# Patient Record
Sex: MALE | Race: WHITE
[De-identification: names, ages, dates, MRNs, and addresses within clinical notes are randomized per-mention and may not be internally consistent; named-entity substitution may affect disease eponyms.]

---

## 2018-12-13 ENCOUNTER — HOSPITAL ENCOUNTER (INPATIENT)
Dept: HOSPITAL 95 - ER | Age: 58
LOS: 5 days | Discharge: HOME | DRG: 291 | End: 2018-12-18
Attending: HOSPITALIST | Admitting: HOSPITALIST
Payer: COMMERCIAL

## 2018-12-13 VITALS — HEIGHT: 70 IN | BODY MASS INDEX: 24.08 KG/M2 | WEIGHT: 168.21 LBS

## 2018-12-13 DIAGNOSIS — E11.22: ICD-10-CM

## 2018-12-13 DIAGNOSIS — Z86.73: ICD-10-CM

## 2018-12-13 DIAGNOSIS — E11.65: ICD-10-CM

## 2018-12-13 DIAGNOSIS — I25.10: ICD-10-CM

## 2018-12-13 DIAGNOSIS — N18.6: ICD-10-CM

## 2018-12-13 DIAGNOSIS — Z79.4: ICD-10-CM

## 2018-12-13 DIAGNOSIS — I50.23: ICD-10-CM

## 2018-12-13 DIAGNOSIS — D63.1: ICD-10-CM

## 2018-12-13 DIAGNOSIS — N17.9: ICD-10-CM

## 2018-12-13 DIAGNOSIS — I13.2: Primary | ICD-10-CM

## 2018-12-13 DIAGNOSIS — I16.0: ICD-10-CM

## 2018-12-13 DIAGNOSIS — Z91.14: ICD-10-CM

## 2018-12-13 LAB
CK MB CFR SERPL CALC: 2.6 % (ref 0–4)
CK SERPL-CCNC: 495 U/L (ref 39–308)
TROPONIN I SERPL-MCNC: 0.36 NG/ML (ref 0–0.04)

## 2018-12-13 PROCEDURE — C1750 CATH, HEMODIALYSIS,LONG-TERM: HCPCS

## 2018-12-14 LAB
ALBUMIN SERPL BCP-MCNC: 1.9 G/DL (ref 3.4–5)
ALBUMIN/GLOB SERPL: 0.6 {RATIO} (ref 0.8–1.8)
ALT SERPL W P-5'-P-CCNC: 30 U/L (ref 12–78)
ANION GAP SERPL CALCULATED.4IONS-SCNC: 11 MMOL/L (ref 6–16)
AST SERPL W P-5'-P-CCNC: 29 U/L (ref 12–37)
BILIRUB SERPL-MCNC: 0.3 MG/DL (ref 0.1–1)
BUN SERPL-MCNC: 67 MG/DL (ref 8–24)
CALCIUM SERPL-MCNC: 7.7 MG/DL (ref 8.5–10.1)
CHLORIDE SERPL-SCNC: 112 MMOL/L (ref 98–108)
CHOLEST SERPL-MCNC: 250 MG/DL (ref 50–200)
CHOLEST/HDLC SERPL: 6.6 {RATIO}
CK MB CFR SERPL CALC: 2.6 % (ref 0–4)
CK SERPL-CCNC: 488 U/L (ref 39–308)
CO2 SERPL-SCNC: 20 MMOL/L (ref 21–32)
CREAT SERPL-MCNC: 7.17 MG/DL (ref 0.6–1.2)
DEPRECATED RDW RBC AUTO: 45.5 FL (ref 35.1–46.3)
ERYTHROCYTE [DISTWIDTH] IN BLOOD BY AUTOMATED COUNT: 14.1 % (ref 11.7–14.2)
GLOBULIN SER CALC-MCNC: 3.2 G/DL (ref 2.2–4)
GLUCOSE SERPL-MCNC: 154 MG/DL (ref 70–99)
HCT VFR BLD AUTO: 29.8 % (ref 37–53)
HDLC SERPL-MCNC: 38 MG/DL (ref 39–?)
HGB BLD-MCNC: 10.2 G/DL (ref 13.5–17.5)
LDLC SERPL CALC-MCNC: 182 MG/DL (ref 0–110)
LDLC/HDLC SERPL: 4.8 {RATIO}
MAGNESIUM SERPL-MCNC: 2.1 MG/DL (ref 1.6–2.4)
MCHC RBC AUTO-ENTMCNC: 34.2 G/DL (ref 31.5–36.5)
MCV RBC AUTO: 88 FL (ref 80–100)
NRBC # BLD AUTO: 0 K/MM3 (ref 0–0.02)
NRBC BLD AUTO-RTO: 0 /100 WBC (ref 0–0.2)
PHOSPHATE SERPL-MCNC: 5.4 MG/DL (ref 2.5–4.9)
PLATELET # BLD AUTO: 215 K/MM3 (ref 150–400)
POTASSIUM SERPL-SCNC: 4.4 MMOL/L (ref 3.5–5.5)
PROT SERPL-MCNC: 5.1 G/DL (ref 6.4–8.2)
SODIUM SERPL-SCNC: 143 MMOL/L (ref 136–145)
TRIGL SERPL-MCNC: 149 MG/DL (ref 30–160)
TROPONIN I SERPL-MCNC: 0.83 NG/ML (ref 0–0.04)
VLDLC SERPL CALC-MCNC: 29 MG/DL (ref 6–32)

## 2018-12-15 LAB
ALBUMIN SERPL BCP-MCNC: 1.9 G/DL (ref 3.4–5)
ANION GAP SERPL CALCULATED.4IONS-SCNC: 10 MMOL/L (ref 6–16)
BUN SERPL-MCNC: 73 MG/DL (ref 8–24)
CALCIUM SERPL-MCNC: 7.7 MG/DL (ref 8.5–10.1)
CHLORIDE SERPL-SCNC: 112 MMOL/L (ref 98–108)
CK SERPL-CCNC: 611 U/L (ref 39–308)
CO2 SERPL-SCNC: 21 MMOL/L (ref 21–32)
CREAT SERPL-MCNC: 7.7 MG/DL (ref 0.6–1.2)
GLUCOSE SERPL-MCNC: 60 MG/DL (ref 70–99)
HCT VFR BLD AUTO: 29.3 % (ref 37–53)
HGB BLD-MCNC: 9.6 G/DL (ref 13.5–17.5)
MAGNESIUM SERPL-MCNC: 2.3 MG/DL (ref 1.6–2.4)
PHOSPHATE SERPL-MCNC: 5.5 MG/DL (ref 2.5–4.9)
POTASSIUM SERPL-SCNC: 4.2 MMOL/L (ref 3.5–5.5)
SODIUM SERPL-SCNC: 143 MMOL/L (ref 136–145)
URATE SERPL-MCNC: 7.9 MG/DL (ref 3.5–7.2)

## 2018-12-16 LAB
ALBUMIN SERPL BCP-MCNC: 2.1 G/DL (ref 3.4–5)
ANION GAP SERPL CALCULATED.4IONS-SCNC: 11 MMOL/L (ref 6–16)
BASOPHILS # BLD AUTO: 0.07 K/MM3 (ref 0–0.23)
BASOPHILS NFR BLD AUTO: 1 % (ref 0–2)
BUN SERPL-MCNC: 82 MG/DL (ref 8–24)
CALCIUM SERPL-MCNC: 8 MG/DL (ref 8.5–10.1)
CHLORIDE SERPL-SCNC: 109 MMOL/L (ref 98–108)
CO2 SERPL-SCNC: 21 MMOL/L (ref 21–32)
CREAT SERPL-MCNC: 8.16 MG/DL (ref 0.6–1.2)
DEPRECATED RDW RBC AUTO: 47 FL (ref 35.1–46.3)
EOSINOPHIL # BLD AUTO: 0.39 K/MM3 (ref 0–0.68)
EOSINOPHIL NFR BLD AUTO: 6 % (ref 0–6)
ERYTHROCYTE [DISTWIDTH] IN BLOOD BY AUTOMATED COUNT: 14 % (ref 11.7–14.2)
GLUCOSE SERPL-MCNC: 80 MG/DL (ref 70–99)
HCT VFR BLD AUTO: 30.2 % (ref 37–53)
HGB BLD-MCNC: 10 G/DL (ref 13.5–17.5)
IMM GRANULOCYTES # BLD AUTO: 0.02 K/MM3 (ref 0–0.1)
IMM GRANULOCYTES NFR BLD AUTO: 0 % (ref 0–1)
LYMPHOCYTES # BLD AUTO: 1.59 K/MM3 (ref 0.84–5.2)
LYMPHOCYTES NFR BLD AUTO: 26 % (ref 21–46)
MAGNESIUM SERPL-MCNC: 2.2 MG/DL (ref 1.6–2.4)
MCHC RBC AUTO-ENTMCNC: 33.1 G/DL (ref 31.5–36.5)
MCV RBC AUTO: 91 FL (ref 80–100)
MONOCYTES # BLD AUTO: 0.52 K/MM3 (ref 0.16–1.47)
MONOCYTES NFR BLD AUTO: 9 % (ref 4–13)
NEUTROPHILS # BLD AUTO: 3.53 K/MM3 (ref 1.96–9.15)
NEUTROPHILS NFR BLD AUTO: 58 % (ref 41–73)
NRBC # BLD AUTO: 0 K/MM3 (ref 0–0.02)
NRBC BLD AUTO-RTO: 0 /100 WBC (ref 0–0.2)
PHOSPHATE SERPL-MCNC: 6 MG/DL (ref 2.5–4.9)
PLATELET # BLD AUTO: 240 K/MM3 (ref 150–400)
POTASSIUM SERPL-SCNC: 4.4 MMOL/L (ref 3.5–5.5)
PROT UR-MCNC: 512.1 MG/DL (ref 0–11.9)
SODIUM SERPL-SCNC: 141 MMOL/L (ref 136–145)

## 2018-12-16 PROCEDURE — 5A1D70Z PERFORMANCE OF URINARY FILTRATION, INTERMITTENT, LESS THAN 6 HOURS PER DAY: ICD-10-PCS | Performed by: SURGERY

## 2018-12-16 PROCEDURE — 02HV33Z INSERTION OF INFUSION DEVICE INTO SUPERIOR VENA CAVA, PERCUTANEOUS APPROACH: ICD-10-PCS | Performed by: SURGERY

## 2018-12-17 LAB
ALBUMIN SERPL BCP-MCNC: 1.9 G/DL (ref 3.4–5)
ANION GAP SERPL CALCULATED.4IONS-SCNC: 10 MMOL/L (ref 6–16)
BASOPHILS # BLD AUTO: 0.06 K/MM3 (ref 0–0.23)
BASOPHILS NFR BLD AUTO: 1 % (ref 0–2)
BUN SERPL-MCNC: 53 MG/DL (ref 8–24)
CALCIUM SERPL-MCNC: 7.5 MG/DL (ref 8.5–10.1)
CHLORIDE SERPL-SCNC: 104 MMOL/L (ref 98–108)
CO2 SERPL-SCNC: 26 MMOL/L (ref 21–32)
CREAT SERPL-MCNC: 5.96 MG/DL (ref 0.6–1.2)
DEPRECATED RDW RBC AUTO: 45.9 FL (ref 35.1–46.3)
EOSINOPHIL # BLD AUTO: 0.4 K/MM3 (ref 0–0.68)
EOSINOPHIL NFR BLD AUTO: 6 % (ref 0–6)
ERYTHROCYTE [DISTWIDTH] IN BLOOD BY AUTOMATED COUNT: 14 % (ref 11.7–14.2)
GLUCOSE SERPL-MCNC: 69 MG/DL (ref 70–99)
HCT VFR BLD AUTO: 28.9 % (ref 37–53)
HEP C VIRUS AB: >11 (ref 0–0.9)
HGB BLD-MCNC: 9.5 G/DL (ref 13.5–17.5)
IMM GRANULOCYTES # BLD AUTO: 0.04 K/MM3 (ref 0–0.1)
IMM GRANULOCYTES NFR BLD AUTO: 1 % (ref 0–1)
LYMPHOCYTES # BLD AUTO: 1.07 K/MM3 (ref 0.84–5.2)
LYMPHOCYTES NFR BLD AUTO: 15 % (ref 21–46)
MAGNESIUM SERPL-MCNC: 1.9 MG/DL (ref 1.6–2.4)
MCHC RBC AUTO-ENTMCNC: 32.9 G/DL (ref 31.5–36.5)
MCV RBC AUTO: 90 FL (ref 80–100)
MONOCYTES # BLD AUTO: 0.64 K/MM3 (ref 0.16–1.47)
MONOCYTES NFR BLD AUTO: 9 % (ref 4–13)
NEUTROPHILS # BLD AUTO: 4.85 K/MM3 (ref 1.96–9.15)
NEUTROPHILS NFR BLD AUTO: 69 % (ref 41–73)
NRBC # BLD AUTO: 0 K/MM3 (ref 0–0.02)
NRBC BLD AUTO-RTO: 0 /100 WBC (ref 0–0.2)
PHOSPHATE SERPL-MCNC: 5.3 MG/DL (ref 2.5–4.9)
PLATELET # BLD AUTO: 238 K/MM3 (ref 150–400)
POTASSIUM SERPL-SCNC: 3.9 MMOL/L (ref 3.5–5.5)
SODIUM SERPL-SCNC: 140 MMOL/L (ref 136–145)

## 2018-12-17 PROCEDURE — 5A1D70Z PERFORMANCE OF URINARY FILTRATION, INTERMITTENT, LESS THAN 6 HOURS PER DAY: ICD-10-PCS | Performed by: SURGERY

## 2018-12-18 LAB
ALBUMIN SERPL BCP-MCNC: 1.9 G/DL (ref 3.4–5)
ALBUMIN SERPL-MCNC: 2.1 G/DL (ref 2.9–4.4)
ALBUMIN/GLOB SERPL: 0.9 {RATIO} (ref 0.7–1.7)
ALPHA1 GLOB SERPL ELPH-MCNC: 0.2 G/DL (ref 0–0.4)
ALPHA2 GLOB SERPL ELPH-MCNC: 0.8 G/DL (ref 0.4–1)
ANION GAP SERPL CALCULATED.4IONS-SCNC: 8 MMOL/L (ref 6–16)
B-GLOBULIN SERPL ELPH-MCNC: 0.7 G/DL (ref 0.7–1.3)
BUN SERPL-MCNC: 33 MG/DL (ref 8–24)
CALCIUM SERPL-MCNC: 7.9 MG/DL (ref 8.5–10.1)
CHLORIDE SERPL-SCNC: 101 MMOL/L (ref 98–108)
CO2 SERPL-SCNC: 30 MMOL/L (ref 21–32)
CREAT SERPL-MCNC: 5.06 MG/DL (ref 0.6–1.2)
GAMMA GLOB SERPL ELPH-MCNC: 0.7 G/DL (ref 0.4–1.8)
GLOBULIN SER CALC-MCNC: 2.4 G/DL (ref 2.2–3.9)
GLUCOSE SERPL-MCNC: 75 MG/DL (ref 70–99)
HCT VFR BLD AUTO: 28.7 % (ref 37–53)
HGB BLD-MCNC: 9.4 G/DL (ref 13.5–17.5)
IGG SERPL-MCNC: 643 MG/DL (ref 700–1600)
IGM SERPL-MCNC: 31 MG/DL (ref 20–172)
MAGNESIUM SERPL-MCNC: 2 MG/DL (ref 1.6–2.4)
PHOSPHATE SERPL-MCNC: 4.1 MG/DL (ref 2.5–4.9)
POTASSIUM SERPL-SCNC: 3.6 MMOL/L (ref 3.5–5.5)
PROT SERPL-MCNC: 4.5 G/DL (ref 6–8.5)
SODIUM SERPL-SCNC: 139 MMOL/L (ref 136–145)

## 2018-12-18 PROCEDURE — 5A1D70Z PERFORMANCE OF URINARY FILTRATION, INTERMITTENT, LESS THAN 6 HOURS PER DAY: ICD-10-PCS | Performed by: SURGERY

## 2018-12-19 LAB
ANTIGLOMERULAR BM AB: 4 UNITS (ref 0–20)
ANTIPROTEINASE 3 (PR-3) ABS: <3.5 U/ML (ref 0–3.5)
ATYPICAL PANCA: (no result) TITER
CYTOPLASMIC (C-ANCA): (no result) TITER
MYELOPEROXIDASE AB SER IA-ACNC: <9 U/ML (ref 0–9)
PERINUCLEAR (P-ANCA): (no result) TITER

## 2018-12-20 LAB — PROT UR-MCNC: 450.3 MG/DL

## 2019-12-05 NOTE — NUR
DR. ROLON WITH VERBAL ORDERS TO GIVE PT HIS COREG AND CLONIDINE AFTER
DIALYSIS; TO BEGIN HIS COZAAR IN THE AM. D/C NORMAL SALINE INFUSION. MAY GIVE
HEPARIN ONCE BP BELOW SYSTOLIC .

## 2019-12-05 NOTE — NUR
DR. ROLON NOTIFIED AND GAVE VERBAL ORDER FOR CLONIDINE 0.1 mg TID AND STATED
WILL BE IN TO SEE KAMALA MUSA RN AT BEDSIDE FOR DIALYSIS.

## 2019-12-05 NOTE — NUR
DIALYSIS PORT WITH NO DRESSING UPON PT ARRIVAL TO ICU ROOM 10. PT STATED THAT
THE DRESSING HAS BEEN OFF FOR AT LEAST ONE DAY. DRESSING APPLIED USING USING
STERILE DRESS CHANGE TECHNIQUE. DIMPLE INFORMED AND WILL PULL A SET OF BLOOD
CX FROM PORT.

## 2019-12-06 NOTE — NUR
A&O X3, FOLLOWS COMMANDS. NO S/S ACUTE DISTRESS NOTED AT THIS TIME.
DENIES ANY NEEDS AT THIS TIME.
DENIES
PAIN OR DISCOMFORT. SIDE RAILS IN PLACE, BED ALARM ACTIVATED, CALL LIGHT AND
POSSESSIONS IN REACH. RESTING COMFORTABLY AT THIS TIME.

## 2019-12-06 NOTE — NUR
PT TRANSFERRED TO PCU VIA STRETCHER, ASSUMED CARE OF PT. IN NO ACUTE DISTRESS
AT THIS TIME, DENIES ANY NEEDS. WILL CONTINUE TO MONITOR.

## 2019-12-06 NOTE — NUR
PT TRANSFERRED TO [facility] 11
ALL BELONGINGS TAKEN WITH PT. KELLEY UPON TRANSFER. REPORT TO ALLEN FUENTES

## 2019-12-06 NOTE — NUR
ASSUMED CARE
PT. ALERT AND ORIENTED THIS AM. PT. REPORTS PAIN TO BACK AND KNEE, PT REPORTS
HE HAS CHRONIC PAIN AND TAKES OXYCODONE AT HOME FOR HIS PAIN CONTROL. PT. ABLE
TO ASSIST WITH REPOSITIONING IN BED. MOVES ALL EXTREM. PT VSS THIS AM,
CURRENTLY ON RA. PLANS FOR DIALYSIS AGAIN TODAY. HEMODIALYSIS PORT TO RIGHT
CHEST WALL, DRESSING CHANGED BY NOC SHIFT RN PER REPORT. BG STABLE THIS AM. PT
PROVIDED WITH A SNACK PER REQUEST AND WATER AT BEDSIDE. CALL LIGHT IN REACH.
BED IN LOW POSITION.

## 2019-12-06 NOTE — NUR
DR. ROLON:
STATED WHEN AT BEDSIDE. NO NEED TO DO BLOOD CX FROM DIALYSIS PORT AFTER
VERIFYING THAT PT DID NOT HAVE FEVER.

## 2019-12-06 NOTE — NUR
PT COMPLETED DIALYSIS.
UPON RETURN TO ICU, PT BG CHECKED AND PT MED FOR PAIN PER DR. SAHU. HOME
MEDICATIONS REORDERED BY DR. MCCAIN. PT ASSISTED WITH LUNCH TRAY. VSS UPON
RETURN. CALL LIGHT IN REACH.

## 2019-12-06 NOTE — NUR
DR. ROLON TO BEDSIDE. SPOKE WITH PT. WILL DIALYZE AGAIN TODAY.
 
SHIFT SUMMARY:  PT FOR THE MOST PART A+O WHEN AWAKE, FORGETFUL WHEN FIRST
AWAKENS, BP STABILIZED AFTER ADMIN OF MED POST DIALYSIS. PT MAIN COMPLAINTS
WERE PAIN TO BACK AND RIGHT LEG WHICH WERE RELIEVED WITH FENTANYL-AND HUNGER.
PT CHANGE TO ADA DIET AND WAS GIVEN MEAT, CHEESE, AND SF TAPIOCA PUDDING. PT
REMAINS IN BED RESTING QUIETLY. CALL LIGHT WIHING REACH.

## 2019-12-06 NOTE — NUR
Assumed care
assumed care of pt at 1900 from GINA Dill. Pt sitting in bed, eyes
closed. Arrousable to verbal but startles easily. HTN noted but all
other VSS. Breathing even and unlabored on RA. Alert and oriented but
with a delayed response at times. Pt conversing with staff asking
personal questions such as "What is your last name? Where do you live?".
Pt redirectable on expectations of appropriate behavior. Pt follows
commands. Per report from offgoing RN, pt had not voided during day
shift. Pt voided at approx 2030 this night and bladder scanned post void
per orders. Post void residual of 356; Provider Gordo called per orders.
Orders recieved for flomax 0.4 to be given tonight, bladder scan in AM,
and PSA screen.
 
See shift assessment for detailed systems assessment.
 
Permacat site CDI. Dressing WNL. site red, not warm, and not swollen.
 
No acute concerns to note at this time.Will continue to monitor

## 2019-12-07 NOTE — NUR
MORNING SUMMARY
PT HAS BEEN RESTING IN BED THIS SHIFT. DOCTOR ROUNDS MADE AND DISCUSION ABOUT
PLAN OF CARE AND MEDICATION CHANGES. ALL QUESTIONS ANSWERED. PT RECEIVED
DIALYSIS WITHOUT COMPLICATION. PT HAS GOOD APPETITE AND EATS 100%. HEART RATE
SINUS MARLYN IN 50-60s, LUNGS CTA AND PT ON RM AIR, BOWEL SOUNDS PRESENT. SKIN
HAS BRUISES TO BILAT UE AND LE. MEPELEX TO COCCYX. PT TAKES MEDICATIONS
WITHOUT DIFFICULTY. PT AWARE OF NEED TO TRANSFER TO MEDICAL FLOOR. VS REMAIN
STABLE. PERMA CATH SITE TO RT UPPER CHEST WALL WITH DRESSING C/D/I. WILL
CONTINUE TO MONITOR UNTIL TRANS TO MED FLOOR. REPORT GIVEN TO BRONSON ENGEL RN, BEFORE MEJIA TO FLOOR.

## 2019-12-07 NOTE — NUR
PT ARRIVED ON THE FLOOR THIS AFTERNOON FROM PCU. PT ALERT AND ORIENTED DURING
THIS SHIFT. PT ARRIVED VIA WHEELCHAIR AND TRANSFERED TO BED WITH MINIMAL
ASSISTANCE. PT WEAK UPON STANDING. PT WORKED WITH PT THIS AFTERNOON. PT
RECOMMENDED PT USE FWW AN GAITBELT WHEN UP. PT CURRENTLY UP IN BED WATCHING
TELEVISION AND EATING DINNER.

## 2019-12-07 NOTE — NUR
ASSUMED CARE WITH AIYANA GARCIA RN. REPORT FROM GINA GONZALEZ. CALLED DR. MCCAIN BECAUSE PATIENT IS REQUESTING LIDOCAINE PATCH. MD WILL EVALUATE WHEN
HE ROUNDS TODAY. ORDERS FOR PT/OT RECEIVED. ADVISED OF LOW BLOOD GLUCOSE
DURING NOC. MD WILL EVALUATE LONG ACTING INSULIN ORDER. GINA SOLARES SETTING UP
FOR DIALYSIS. REPORTS PATIENT'S K WAS 8.5 WHEN HE CAME IN BECAUSE HE HAD
MISSED DIALYSIS. EDUCATION TO PATIENT ABOUT NOT MISSING DIALYSIS AND TO CALL
EMS TO BRING TO HOSPITAL IF FEELING TOO UNWELL TO GO TO [facility]. ALSO EDUCATION
TO CONSULT WITH DR. ROLON PRIOR TO TAKING OVER THE COUNTER MEDICATIONS SUCH AS
JAMMIE SELTZER AND MILK OF MAG THAT HE HAS BEEN TAKING. UNABLE TO PERFORM
BLADDER SCAN THIS AM BECAUSE PATIENT WAS EATING BREAKFAST AND NOW WILL HAVE
DIALYSIS. GINA MURRAY WILL ASSESS PATIENT.

## 2019-12-07 NOTE — NUR
Shift Summary
VSS this shift, pt with cbg50 which improved to 83 with juice. Alert and
oriented, able to make needs known. No changes from initial shift
assessment. No BM this shift. No acute declines to note, no concerns
overnight. no acute changes from time of assumption of care. Will
continue to monitor until day RN assumes care.

## 2019-12-08 NOTE — NUR
PT DISCHARGED TO HOME. PT ALERT AND ORIENTED THROUGHOUT THIS SHIFT. PT'S
FRIEND PROVIDING TRANSPORTATION HOME. PT PROVIDED WITH DISCHARGE EDUCATION
AND MEDICATION EDUCATION PRIOR DO DISCHARGE. PT UP IN ROOM WITH FWW PRIOR TO
DISCHARGE. IV'S REMOVED PRIOR TO DISCHARGE. PT BELONGINGS WITH PT UPON
DISCHARGE. PT TO WHEELCHAIR INDEPENDENTLY WITH USE OF FWW. PT'S FRIEND PUSHING
WHEELCHAIR TO VEHICLE DENYING NEED FOR ASSISTANCE.

## 2019-12-08 NOTE — NUR
SHIFT SUMMARY
 
NO ACUTE EVENTS OVERNIGHT. PATIENT AWAKE FOR MOST OF NOC SHIFT. AAOX4. UP
INDEPENDENT. WILL CONTINUE TO MONITOR.

## 2021-02-04 NOTE — NUR
PATIENT ARRIVED FROM ED, TRANSFERED FROM [facility] TO BED WITH STAFF ASSIST.
PATIENT IS ALERT AND ORIENTED, VSS, NO SIGNS OF ACUTE DISTRESS. BED IN LOW
POSITION, CALL LIGHT WITHIN REACH.

## 2021-02-04 NOTE — NUR
[place] of Care:
 
Care assumed at 1520hr from Abby FUENTES. Patient receiving dialysis at this time,
with dialysis RN Jerad in the room. Patient a/o x4 sitting upright in bed, with
no complaints. Patient asking for food at this time. Call placed to Charlie NP to
address diet order and discuss repeat lab values when dialysis is finished,
also to address glucose level of 490. Peripheral IV to lt wrist patent and
intact. Dialysis Perma-Cath to rt upper chest in use by dialysis RN, site wnl,
dressing C/D/I. Will continue to monitor.

## 2021-02-04 NOTE — NUR
Transfer to PCU 14:
 
Report called to Yodit FUENTES in PCU. Patient transferred via stretcher by this
RN at 1730hr, belongings sent with patient to new room. Patient remains
stable.

## 2021-02-05 NOTE — NUR
SHIFT SUMMARY
 
PT WAS ALERT AND ORIENTED AND FORGETFUL AT TIMES, REPEATING QUESTIONS SHORTLY
AFTER ASKING THEM. PT ASKED FOR OXYCODONE T/O SHIFT STATING PAIN IN HEAD, THEN
L SHOULDER, THEN STOMACH, THEN L ARM. PRN PAIN MEDICATION WAS ADMINISTERED AS
DIRECTED WITH RELIEF OF PAIN. VITALS STABLE, BP HYPERTENSIVE 159-141 SYSTOLIC.
HR 50-60 BPM, PT REPORTED NO CHEST PAIN. O2 SATS >95% ON ROOM AIR, PT WAS ON
1LPM VIA NC BUT PULLED IT OFF AND WAS RA MOST OF THE NIGHT. POTASSIUM AND
CREATININE BACK UP WITH AM LABS, PER DR ROLON START DIALYSIS GIVE AMP CALCIUM
GLUC AND SODIUM BICARB, WITH 3 UNITS LISPRO SC NOW. PT CURRENTLY IN ROOM WITH
NO COMPLAINTS OF DISCOMFORT.

## 2021-02-05 NOTE — NUR
PT PLEASANT TODAY. QUITE TALKATIVE. DID RECEIVE DIALYSIS TODAY. HELD AM BP
MEDS PER DIALYSIS. DID DISCUSS PT ST DEPRESSION WITH DR ROLON TODAY.
NO NEW CONCERNS TODAY. BED IN LOW POSITION, CALL LITE IN
REACH, CALLS APPROP

## 2021-02-06 NOTE — NUR
SHIFT SUMMARY:
 
NO ACUTE EVENTS. TELEMETRY SHOWED SR 70'S. POTASSIUM LEVEL 5.9 TODAY,
HAD DIALYSIS THIS MORNING. O2 SAT ON RA 93%, PLACED ON 2 L/MIN NC WITH O2 SAT
96-97%, STATED HE NORMALLY USES 3 L/MIN NC AT HOME. HAS VERY QUICK TEMPER AND
SHOUTS PERIODICALLY. IS FORGETFUL, REPEATS QUESTIONS. C/O PAIN IN R SHOULDER,
WAS MEDICATED PER EMAR, HAS HEATING PAD ON. REFUSED TO BE REPOSITIONED BY
NURSING ALL DAY, IS ABLE TO MAKE SMALL POSITION CHANGES HIMSELF. HAD VISIT
FROM FRIEND THIS AFTERNOON.

## 2021-02-06 NOTE — NUR
SHIFT SUMMARY:
 
PT A&O X4. REPEATS SELF AT TIMES. PT DID HOLLER OUT ONCE THIS SHIFT REQUESTING
ASSISTANCE TO USE URINAL. PT REMINDED TO USE CALL LIGHT. CRITICAL POTASSIUM
RESULT LAST NIGHT. DR ROLON NOTIFIED AND PT MEDICATED WITH LOKELMA PER ORDERS.
MORNING POTASSIUM 5.9. PT WITH LOW GRADE TEMPS THIS SHIFT RANGING FROM
99.3 TO 99.6. DENIES CHILLS AND BODY ACHES. BP ELEVATED; GIVEN SCHEDULED BP
MEDS PER ORDERS. MOST RECENT /91. NSR PER TELE. PT C/O RIGHT SHOULDER
PAIN THIS MORNING R/T A TORN ROTATOR CUFF. PT GIVEN K PAD AND 2.5MG OXY.

## 2021-02-07 NOTE — NUR
SHIFT SUMMARY
PATIENT IS A&0X4, CAN BE FORGETFUL AND KEPT ASKING SAME QUESTIONS REPEATEDLY.
PATIENT BECOMES FRUSTRATED AND ANGRY QUICKLY AT TIMES YELLING. PATIENT
MEDICATED FOR SHOULDER PAIN AND HEADACHE, SEE EMAR. ALSO OFFERED HEATING PAD
FOR SHOULDER AND ICEPACK FOR HEADACHE. 02 SATS >95% ON RA. VSS, NO ACUTE
CHANGES. CALL LIGHT IN REACH.

## 2021-02-07 NOTE — NUR
SHIFT SUMMARY
RENÉ COMPLAINED OF R SHOULDER PAIN AND GOT OXY AND LIDOCAINE PATCH.  FRIEND
VISITED.  HAD UNVISUALIZED BM IN BATHROOM, WAS HELPED WITH SBA BY HIS FRIEND.
UP IN CHAIR FOR A WHILE THIS AFTERNOON.  DIALYSIS DONE AT BEDSIDE THIS
MORNING.  TELE SHOWING NSR 65;  BLOOD SUGARS HIGH.  EDUCATION DONE ON HIGH
POTASSIUM FOODS.  CALL LIGHT IN REACH, [facility]

## 2021-02-08 NOTE — NUR
NIGHT SHIFT SUMMARY
PT HAS C/O RIGHT SHOULDER PAIN THROUGHOUT THE SHIFT BUT DOES NOT APPEAR TO BE
IN SIGNIFICANT PAIN, MEDICATED PER EMAR Q6H AND GIVEN HEAT PACK. PT'S BP
MODERATELY ELEVATED W SBP IN 'S AT THE START OF THE SHIFT AND THEN
STABLE IN 'S FOR THE REST OF THE NIGHT. PT HAD A 2100 CBG  AFTER
KRISTEL RN REPORTED HE HAD A SANDWHICH AFTER HE ATE HIS DINNER, 8 UNITS OF
INSULIN GIVEN PER SS. PT HAS DENIED ANY C/P THIS SHIFT AND HAS REMAINED IN NSR
IN THE 60-70'S ALL NIGHT. PT IS AXO X4 BUT FORGETFULL AT TIMES. WILL REPORT TO
KRISTEL RN, WCTM.

## 2021-02-08 NOTE — NUR
DISCHARGE
PT LEFT AT 1700 VIA WHEELCHAIR TO [facility]. BELONGINGS SENT WITH PATIENT.
IV REMOVED PRIOR TO DISCHARGE. DISCHARGE PACKET GIVEN TO .

## 2021-02-18 NOTE — NUR
SHAVE THAT IS NOT HERE
PT BELIEVES HE HAD A SHAVER HERE THAT IS NO LONGER HERE, ATTEMPTED TO CALL
MARIA LUISA HIS FRIEND ON HIS EMERGENCY CONTACTS, NO ANSWER. NO DOCUMENTATION OF A
SHAVER HAVING BEEN HERE. PT PT ADVOCATE REFERRAL IN TO FOLLOW UP WITH PT ON
THIS.

## 2021-02-18 NOTE — NUR
Transfer to  326:
 
Patient mostly slept throughout afternoon. VS remained stable. Repeat lab
values called to Dr. Caro, received order for PO Phosogel caps TID with
meals. Call also placed to Charlie Walls NP, received order for transfer to
medical floor with telemetry. Patient continues to deny pain or discomfort ,
a/o x4, calm and cooperative with staff. New bed assignment received, report
called to Deann FUENTES on medical floor. Patient and belongings transferred to
new room without difficulty.

## 2021-02-18 NOTE — NUR
Admission/[place] of Care:
 
Patient arrived to unit at 0820hr, via stretcher, accompanied by ED nurse.
Patient slightly drowsy, but awake and oriented x4. Denies pain, discomfort,
SOB, or dyspnea. HR show's sinus bradycardia in the 50's, systolic BP in the
120's and stable. Charlie Walls NP in room to see patient shortly after arrival,
received orders for repeat lab values after dialysis. Jerad dialysis RN in
room at 0845 to run dialysis. Permacath in place to rt upper chest, Jerad RN
applied new dressing and accessed catheter, reports functioning without
difficulty. Peripheral IV's x2 patent and intact. Call light in reach, makes
needs known. Will continue to monitor.

## 2021-02-18 NOTE — NUR
PT ARRIVED TO THE UNIT FROM ICU @ APPROX 1650. PT WAS TRANSFERED TO MEDICAL
BED BY SLIDING WITH 4 PERSON ASSIST. PT IS ON RA AND DOES NOT COMPLAIN OF SOB
OR OTHER DISTRESS. VS ARE WNL UPON ARRIVAL. TELE IN PLACE. CALL LIGHT WITHIN
REACH AND BED IN LOWEST POSITION, BED ALARM ON. PT EDUCATED ON HOW TO USE CALL
SYSTEM.

## 2021-02-19 NOTE — NUR
SHIFT SUMMARY
 
PT A&O W/ PERIODS OF CONFUSION, BECAME QUITE CONFUSED THIS SHIFT AND UPSET
ABOUT PERSONAL BELONGINGS (INCLUDING A SHAVER), WAS UNABLE TO REORIENT, PT WAS
YELLING OUT INTO HALLWAY AND AGITATED ABOUT HIS PERSONAL ITEMS, ABOUT AN HOUR
LATER PT USED CALL LIGHT REACH STAFF TO APOLAGIZE AND REPORT THAT HE REALIZED
HIS EARLIER CONFUSION AND KNOWS HIS SHAVER IS AT [facility]. SLEPT WELL AFTER THIS
EVENT AND IS SLEEPING AT THIS TIME, NO OTHER C/O ANY KIND, CALL LIGHT IN
REACH, WILL CONT TO MONITOR UNTIL REPORT GIVEN TO DAY RN.

## 2021-02-19 NOTE — NUR
PATIENT A/OX4, UP WITH FWW AND 1 ASSIST TODAY. HAD DIALYSIS TODAY, PERM CATH
TO R CHEST. STARTED ON ROCEPHIN AND ZITHROMAX TO TREAT PNA. LUNGS CLEAR, DIM
AND PATIENT IS ON RA. VSS, SR/SB ON TELE. DENIES ANY PAIN. SKIN WITH MULTIPLE
SCABS, PATIENT ENCOURAGED TO STOP PICKING AT THEM. PARANOID BEHAVIORS AT
TIMES, WORRIES ABOUT HIS BELONGININGS THAT ARE IN HIS ROOM AND THE THINGS HE
HAS LEFT AT [facility]. ABLE TO CALM PATIENT AND REASSURE. D/C ORDERS
PLACED TODAY AND PAPERWORK COMPLETED. PLAN IS FOR PATIENT TO DC BACK TO [facility].

## 2021-02-20 NOTE — NUR
SUMMARY
PT C/O OF R SHOULDER PAIN. PT TX AS PER EMAR W/ RELIEF. PT HAD NO OTHER ISSUES
NOTED. PT SLEPT OFF AND ON. PT CURRENTLY SLEEPING AND IN NO DISTRESS. CALL
LIGHT IN REACH.

## 2021-02-20 NOTE — NUR
SHIFT SUMMARY.
A&OX4, INTERMITTENT CONFUSION, MOSTLY WHEN AWAKING, BED ALARM UTILIZED FOR
SAFETY. PLEASANT AND COPPERATIVE WITH CARE. AFEBRILE THIS SHIFT. PT REPORTED
PAIN TO R SHOULDER THIS AFTERNOON, DR. TENORIO NOTIFIED AND RECIEVED ORDER FOR
HOME DOSE OF
OXYCODONE, PT REPORTED PAIN STILL PRESENT BUT LESSENED. NO N/V, SOB. DIALYSIS
COMPLETED THIS AM. PLAN IS TO RETURN TO [facility] TOMORROW PER CM. NO OTHER CHANGES
OR CONCERNS.

## 2021-02-21 NOTE — NUR
SUMMARY
PT HAD NO ISSUES NOTED. PT STAYED UP LATE WATCHING TV. PT CURRENTLY SLEEPING
IN NO DISTRESS. CALL LIGHT IN REACH.

## 2021-02-21 NOTE — NUR
DR. ROLON IN TO SEE PT THIS AM WHILE THIS RN ON BREAK, GINA JIN REPORTED THAT
DR. ROLON REPORTED THAT PT WILL NEED TO DIALYZE TOMORROW PRIOR TO D/C, DR. TENORIO NOTIFIED AND HE REQUESTED D/C TO FACILITY BE CANCELED.

## 2021-02-21 NOTE — NUR
SHIFT SUMMARY.
PT CONTINUES TO REFUSE SHOWER THE PAST TWO SHIFTS AFTER MULTIPLE ATTEMPTS. PT
REPORTED PAIN THIS AM TO LOWER BACK, THIS AFTERNOON REPORTED PAIN TO R
SHOULDER, PAIN MANAGED WITH CURRENT ORDERS. NO N/V, SOB. AMBULATED IN [place]
ONCE WITH CNA. GOOD MEAL INTAKE. NO NEW CHANGES OR CONCERNS.

## 2021-02-22 NOTE — NUR
SUMMARY
PT C/O OF DRY EYES AND EYE DROPS ORDERED. PT HAD NO OTHER ISSUES NOTED. PT UP
MOST OF NIGHT. PT CURRENTLY AWAKE AND IN NO DISTRESS.CALL LIGHT IN REACH.

## 2021-02-22 NOTE — NUR
DISCHARGE
DR TENORIO IN TO SEE PT THIS AM, STATE PT WILL D/C AFTER DIALYSIS THIS A. PT WENT
OUT FOR HD APPROX 0900, BACK TO ROOM 1230. RN CARE MANAGER IN TO SEE HIM STATE
DR PLACE ORDER FOR TRANSFER TO [facility]. PT INITIALLY REFUSED HOWEVER
AFTER ENCOURAGEMENT & SPEAKING w FAMILY HE RECONSIDER, AGREES TO TRANSFER.
REPORT CALLED TO [facility] RN. IV SITES BILAT FA D/C INTACT. TELE D/C'D. PT REFUSES
SHOWER. ASSISTED TO GATHER BELONGINGS. WAITING W/C VAN TRANSFER.

## 2021-05-11 NOTE — NUR
ADMISSION
PATIENT ADMITTED TO ROOM 1830. PATIENT ORIENTED TO ROOM AND GIVEN CALL LIGHT.
LUNG SOUNDS CLEAR. HEART SOUNDS STRONG AND REGULAR. DR ROLON NOTIFIED OF
PATIENTS POTASSIUM LEVEL BY JINNY LINDSAY RN. DELONDA HYATT PER DR ROLON'S
REQUEST FOR DIALYSIS TONIGHT. PATIENT GAVE VERBAL CONSENT FOR PHOTO
DOCUMENTATION OF WOUNDS. WOUNDS CLEANSED, PATIENT HYPERTENSIVE, OTHER VITALS
STABLE. ANTIBIOTICS STARTED. TELE IN PLACE. AWAITING SILVER OINTMENT FROM
PHARMACY FOR WOUNDS. PATIENT RESTING IN ROOM. CALL LIGHT IN PLACE.

## 2021-05-11 NOTE — NUR
STUDENT NURSE DOC REVIEW
THIS RN HELPED WITH ADMITTING AND SETTLING THE PT. THIS RN REVIEWED STUDENT
NURSE'S DOCUMENTATION AND AGREES WITH IT.

## 2021-05-11 NOTE — NUR
SPOKE WITH DR. ROLON
THIS RN CALLED DR. ROLON ABOUT PLAN OF CARE FOR THE PT. WHETHER THE PT WILL
HAVE DIALYSIS TONIGHT OR NOT. DR. ROLON ASKED FOR DIALYSIS NURSE TO BE CALLED
IN AND GIVE THE PT DIAYSIS TONIGHT. DIMPLE DIALYSIS NURSE NOTIFIED OF NEED
AND GIVEN PT ROOM NUMBER. DR. ROLON STATED NO OTHER ORDERS ARE NEEDED AT THIS
TIME, JUST DIALYSIS ASAP.

## 2021-05-12 NOTE — NUR
DISCHARGE REVIEWED WITH PT AND FRIEND, MARIA LUISA. PT AND FRIEND VERBALIZED
UNDERSTANDING MEDS AND INST. IV PULLED INTACT.  TELE REMOVED AND RETURNED.
REMOVED DRESSING ON LEGS. HAD DR TENORIO VIEW LEGS. IS AWARE, NO NEW ORDERS.
RECOMMENDS CHANELL WITH SILVER SULVADIINE, ORDERED ALREADY. IS ON ABX ALREADY
ALSO. WRAPPED PERMACATH WITH COBAN PER BECK ONEAL, DIALYSIS RN INST. PT
WHEELED TO DOOR BY AIDE AT 1520

## 2021-05-12 NOTE — NUR
SHIFT SUMMARY
ADMITTED FOR HYPERKALEMIA & BLE BURNS. AOX3-FORGETFUL & CONFUSED @TIMES. YELLS
OUT & FORGETS TO USE CALL LIGHT FREQUENTLY. FOLLOWS SIMPLE DIRECTIONS.
RECIEVED HD LAST NIGHT, THEY REMOVED 2L OFF. BP ELEVATED THIS /106, GAVE
PO HYDRALAZINE & RECHECKED BP /93. REST OF VITALS STABLE. TELE NSR @73.
REPORTS 8-9/10 PAIN IN BLE, MEDICATED c TYLENOL & 10MG OXYCODONE- PT ABLE TO
REST COMFORTABLY T/O NIGHT. BLE RED, +2 EDEMA, HOT TO TOUCH, TENDER, LLE HAS
SKIN TEAR. SILVERDEAN CREAM APPLIED TO BLE & NONADHERENT PAD c KERLEX TO
PROTECT. CALL LIGHT & BED ALARM IN PLACE FOR SAFETY.

## 2021-06-01 NOTE — NUR
SHIFT SUMMARY
PT ALERT BUT CONFUSED AT TIMES. THIS AM PT WAS A HEAVY 2P ASSIST. AND THIS
AFTERNOON HE IS MORE AWAKE AND ALERT AND ABLE TO TRANSFER TO [facility]. PT REFUSED
TO WORK WITH PT/OT TODAY WILL TRY AGAIN TOMORROW. THIS RN WAS ABLE TO TALK TO
MARIA LUISA WHO IS BESTFRIEND; HE STATED THAT THIS IS THE PT BASELINE AND PT SUFFERS
FROM MENTAL ILLNESS EVEN IN CHILDHOOD. PT HAVE A PERIOD OF NOT SPEAKING; AND
WILL ONLY ANSWER WHEN HE WANTS TO. WILL SOMETIMES CLOSED HIS EYES. PT STATED
HE LIVES WITH NIECE, HOWEVER THE NIECE ARE NOT ABLE TO CARE FOR HIM.  DRESSING
PLACED ON LLE AND CLEANSED. PT INSISTING TO USE WHEELCHAIR TO WALK THIS
AFTERNOON; HOWEVER THIS RN EDUCATED HIM ABOUT SAFETY AND NEED EVALUATION WITH
PT/OT. PT HAS NO RECOLLECTION OF EVERYTHING FROM THIS MORNING WHERE HE WAS NOT
ABLE TO GET UP ON HIS OWN. BED ALARM IS ON AND CALL LIGHT WITHIN REACH.

## 2021-06-01 NOTE — NUR
CALLED DR ROLON FOR CRITICAL LAB OF POTASSIUM 6.4; NO ORDERS AT THIS TIME.
CALLED THE DIALYSIS RN PER DR ROLON TO INFORM

## 2021-06-01 NOTE — NUR
ASSUMED CARE. ALERT, MORE ORIENTED THEN LAST NIGHT, ABLE TO HOLD CONVERSATION
AND DOES NOT SHUT DOWN. ABLE TO STATE HIS NEEDS. ATE A LITTLE BETTER TODAY. IS
TIRED FROM DIALYSIS BUT HIS COLOR IS MUCH BETTER. REDNESS IN BLE HAVE
DECREASE, NO EDEMA NOTED. LLE DRESSING CHANGED, SLOUGH IS COMING OFF THE
WOUND. STILL STATES IT ITCHES EVERY NOW AND THEN. TROPONIN HAS COME DOWN TO
0.149. BLOOD SUGAR 432. GAVE SCHEDULED LANTUS AND 5 UNITS OF SLIDING SCALE. MD
CALLED AND NOTIFIED. GAVE ANOTHER 5 UNITS OF SLIDING SCALE PER ORDERS. STILL
HAS NOT URINATE THIS SHIFT. ONLY URINATED ONCE ON DAY SHIFT. VSS/AFEBRILE. NO
NAUSEA. WILL CONTINUE TO MONITOR. CALL LIGHT IS IN REACH.

## 2021-06-02 NOTE — NUR
RECHECK OF BLOOD SUGAR 310. RENÉ IS VERY FORGETFUL AND AT TIMES CALLS OUT FOR
HELP LIKE "IM GOING TO WALK" WHEN ASKED WHAT HE MEANS, HE WOULD ASK FOR ME TO
TAKE A LOOK AT HIS LEG. WHEN i SHOWED HIM HIS LEG "HE WAS ALL SURPRISED ABOUT
THE DRESSING ON IT LIKE HE DID NOT REMEBER i PLACED IT EARLIER. HE RELAXED AND
WENT TO SLEEP. BED ALARM IS ON.

## 2021-06-02 NOTE — NUR
SHIFT SUMMARY
PT ALERT AND CONFUSED AT TIMES. PT WORKED WITH PT/OT TODAY; SEE NOTES. PT WILL
HAVE SURGERY TOMORROW FOR HIS LLE; THEREFORE, NPO AFTER MIDNIGHT. MEDICATED
FOR PAIN PER EMAR. PT ASKED HIS FRIEND TO GET HIS PAIN PILLS AT HOME, AND
FRIEND TOLD ME ABOUT IT. EDUCATED THE FRIEND THAT WE ARE MEDICATING HIM HERE
FOR PAIN. PT MARIA LUISA STATED THAT THIS PT USUALLY LIKES TO TAKE A LOT OF PAIN
MEDICATIONS AND HE WAS VERY WORRIED ABOUT HIM. EDUCATED THE PT ABOUT PAIN
MEDICATIONS AND NARCOTIC USED. NO OTHER CONCERN NOTED. BED IS IN THE LOWEST
POSITION AND CALL LIGHT WITHIN REACH

## 2021-06-02 NOTE — NUR
SHIFT SUMMARY: MORE ALERT AND INTERACTIVE WITH STAFF. NOT WITHDRAWN WILL
FOLLOW DIRECTION AND ANSWER QUESTIONS. USES CALL LIGHT. DOES FORGET FREQUENTLY
AND CALL FOR OFF THE WALL THINGS. SUCH AS "CALL THE DOCTOR I HURT MY BACK 3
YEARS AGO THAT IS WHAT STARTED ALL THIS" "HELP I CAN'T MOVE" AS HE IS MOVING
THE BED UP AND DOWN. MORE CONFUSED T/O THE NIGHT. SLEPT WELL. NO URINE OUTPUT.
VSS/AFEBRILE. TELE SINUS WITH INVERTED T WAVES. TROPONIN DECREASING DOWN TO
0.149. , K 4.6, MAG 2.2, PHOS 7.1, CREATINE 5.65, GFR 11. BLOOD SUGAR
 AT HS, GAVE 30 UNITS LONG ACTING AND 5 UNITS SLIDING SCALE, MD
NOTIFIED, ORDERED ADDITION 5 UNITS SLIDING SCALE. RECHECK . THIS AM
LABS SHOWED GLUCOSE TO . VERY CONFUSED ABOUT WHY HE NEEDS DIALYSIS AND
STATES IT IS CAUSING HIM NOT TO WALK. TRIED TO EDUCATE BUT HE DID NOT
UNDERSTAND. NO NAUSEA. HE HAS BEEN USING CALL LIGHT MORE OFTEN. WILL REPORT TO
DAY SHIFT. CALL LIGHT IN REACH.

## 2021-06-03 NOTE — NUR
PATIENT IS ALERT WITH INTERMITTENT CONFUSION. HE HAS DIALYSIS TODAY. C/O PAIN
IN HIS LEFT LEG. DR. LINDA ASSESSED THE PATIENT'S LEG THIS AFTERNOON AND
ORDERED WOUND CARE. TELE IS IN PLACE, SR 73 BPM.PATIENT USED THE URINAL HIS
AFTERNOON. THE PATIENT'S NEPHEW IS AT THE BEDSIDE. HE PARTICIPATED WITH PT
BREIFLY AND REFUSED OT. WILL CONTINUE TO MONITOR

## 2021-06-03 NOTE — NUR
SHIFT SUMMARY: ALERT, CONFUSED MOST OF THE TIME. FATIQUED SLEEPING OFF AND ON.
PAIN IN BACK, UNABLE TO GIVE OXYCODONE, FELL BACK TO SLEEP BEFORE ABLE TO GIVE
ANY OTHER PAIN RELEIF. NO URINE OUTPUT THIS SHIFT. POOR INTAKE. NPO SINCE
MIDNIGHT FOR PROCEDURE. DRESSING CHANGED ON LLE, SLOUGH STILL COMING OFF,
WOUND BED BEEFY RED, SHALLOW. REDNESS ON BOTTOM. BLOOD SUGAR 311, COVERAGE
GIVEN, THIS AM DOWN  PER LABS. CREATINE 4.90, GFR-13, PHOS-5.7, CA-8,
C-REACT-15.2. WOUND CULTURES NOT BACK YET. VS WNL, AFEBRILE. BED ALARM ON CALL
LIGHT IN REACH. WILL REPORT TO DAY SHIFT.

## 2021-06-04 NOTE — NUR
PATIENT IS ALERT AND ORIENTED AND FORGETFUL. HE HAD DIALYSIS TODAY. HE IS
SITTING UP IN BED EATING DINNER AT THIS TIME. PATIENT WORKED WITH PT AND OT
TODAY, HE IS REFUSING TO WALK. A WOUND CARE NURSE ASSESSED THE PATIENT'S LLE
WOUND AND GAVE THIS RN SOME INSTRUCTIONS ON WOUND CARE. THE WOUND CARE RN
WOULD LIKE TO CONTINUE USING THE SILVER SULFADIAZINE WITH NON-ADHERANT PADS,
KERLEX AND ACEWRAPS. SHE SUGGESTS CLEANING THE WOUND AND CHANGING THE DRESSING
2-3 TIMES A DAY. DR. HANSEN NOTIFIED OF THIS. WILL CONTINUE TO MONITOR

## 2021-06-04 NOTE — NUR
Pt resting in bed with his eyes closed. Pt struggles with keeping his eyes
opened. Pt reports having dialysis today. Pt does not engage in conversation
much and keeps his eyes closed. Ended visit to allow Pt to rest.
 
Palliative Care will attempt to F/U at a later time.

## 2021-06-04 NOTE — NUR
Brief F/U visit. Primary GINA Hutchison reports Pt is awake. Attempted to visit
with Pt but he is distracted due to his pain. Pt reports taking oxycodone 10mg
at home. Home medicaiton shows oxycodone 10mg every 24 hours. Emar shows
oxycodone 10ng every 24 hours. Primary GINA Hutchison will offer his pain
medication.
 
Spoke with Dr Hirsch and discussed case. Plan to keep Pt on home pain
medicaton dose.
 
Palliative Care will remain available.

## 2021-06-05 NOTE — NUR
SHIFT SUMMARY
ASSUMED CARE OF PT AT 1900. PT IS A/OX4 BUT CONFUSED AT TIMES. HEART SOUNDS
REGULAR, LUNG SOUNDS HAVE DIMINISHED BASES. PT BLADDER SCAN . PT HAS
HERNIA IN GROIN. PT DRESSING WAS CHANGED THIS SHIFT. PT C/O PAIN IN LEG,
MEDICATED PER EMAR.
 
CALL LIGHT IN REACH, BED IN LOWEST POSTION.

## 2021-06-05 NOTE — NUR
PATIENT SLEPT MOST OF THIS SHIFT. WORKED WITH PT, BUT ONLY UP IN CHAIR FOR 15
MINUTES BEFORE WANTING TO GET BACK TO BED. TRANSFERS WELL WITH FWW AND SBA,
ENCOURAGED TO WALK TO RESTROOM INSTEAD OF USING BSC. LLE DRESSING CHANGED X2
THIS SHIFT, MODERATE AMOUNT OF SEROSANGUINOUS DRAINAGE. SILVADENE CREAM
APPLIED TO LLE WOUND, APPLIED NONADHERENT AND EXUDRY AND WRAPPED WITH KELEX
AND ACE WRAP.  PATIENT TOLERATING ADA DIET, ACHS BLOOD SUGARS, COVERAGE PER
SS. FALL PRECAUTIONS IN PLACE. ABLE TO MAKE NEEDS KNOWN.

## 2021-06-06 NOTE — NUR
SHIFT SUMMARY
ASSUMED CARE OF PT AT 1900. PT IS A/OX4 WITH TIMES FOR FORGETFULNESS. PT
FORGETS THAT HE HAD HIS PAIN MEDICATION AND THAT HIS BANDAGE WAS CHANGED.
HEART SOUNDS REGULAR, NO EVENTS THIS NIGHT. LUNG SOUNDS CLEAR. PT WAS CONTIENT
WITH URINAL. DRESSING APPLIED TO COCCYX DUE TO SMALL ABRASION. PT LEG DRESSING
WAS CHANGED THIS SHIFT. PT WAS MEDICATED FOR PAIN PER EMAR. PT IS VERY
UNWILLING TO PARTICIPATE IN HIS OWN ADLS. FOR EXAMPLE, PT DID NOT WANT TO
REACH FOR THE URINAL AT HIS BEDSIDE AND ASKED THIS NURSE TO DO SO. PT ALSO DID
NOT WANT TO OPEN HIS SLENDA PACKETS FOR HIS TEA. PT REQUESTED THAT THIS NURSE
ALSO TUEND HIM IN BED EVEN THOUGH HE IS CAPABLE HIMSELF.
 
CALL LIGHT IN REACH, BED IN LOWEST POSITION.

## 2021-06-06 NOTE — NUR
PATIENT A/OX3 WITH INTERMITTENT CONFUSION AND FLIGHT OF IDEAS. FREQUENTLY GOES
OFF TOPIC WITH CONVERSATION. DRESSING TO LLE CHANGED X1 THIS SHIFT AND
SILVADENE CREAM USED WITH NONADHERENT, EXUDRY, KERLEX AND ACE WRAP. MODERATE
AMOUNT OF SEROSANGUINOUS DRAINAGE. PATIENT UP FOR BREAKFAST, BUT REFUSED TO
GET OOB THE REST OF THE DAY. HD CATH TO R CHEST, PATIENT HAD DIALYSIS TODAY.
ACHS BLOOD SUGARS, COVERAGE PER SS. TOLERATING ADA DIET. NEEDS ENCOURAGEMENT
TO DO THINGS FOR HIMSELF. CEFTRIAXONE DAILY TO TREAT CELLULITIS. FALL
PRECAUTIONS IN PLACE. PATIENT CAN TRANSFER WITH FWW AND SBA.

## 2021-06-07 NOTE — NUR
Pt resting in bed upon arrival. Pt reports pain being managed with current
regimen. Attempted to engage in therapeutic discussion regarding his current
POLST on file vs his current code status. Pt's POLST says he is ok with CPR
but Limited Treatment (No Intubation). Pt states he does not want to talk
about it and states "I'm ok with life support". Encouraged Pt to consider
completing new POLST that reflects his wishes. Pt changes subjects and talks
about [place]. Continued
therapeutic listening. Ended visit to allow Pt to rest.
 
Palliative Care will remain available if called upon.

## 2021-06-07 NOTE — NUR
SHIFT SUMMARY
 
PATIENT MEDICATED X3 FOR PAIN THIS SHIFT, DENIES NAUSEA AND SHORTNESS OF
BREATH. WORKED WITH PT TODAY, UP SBA W/FWW TO BR. WALKED IN [place] WITH PT.
PATIENT CALLS FREQUENTLY FOR NON-URGENT ITEMS. PATIENT FORGETFUL AND
TANGENTIAL IN CONVERSATION. POSSIBLE DIALYSIS TOMORROW.

## 2021-06-07 NOTE — NUR
SUMMARY: A/O W/INTERMITTENT CONFUSION AND MAKES BIZARRE STATEMENTS AT TIMES.
CONVERSATION CAN BE NONSENSICAL W/FLIGHTS OF IDEAS OBSERVED. LLE DX CHANGED
THIS SHIFT W/SIVALDENE APPLIED FOLLOWED BY NONADHERENT DX, EXUDRY, KERLEX AND
ACE WRAP. SEROSANGUINOUS DRAINAGE NOTED. HE CONT'S TO REFUSE GETTING OOB AND
LACKS MOTIVATION FOR ADL'S/SELF CARE. SNACKS PROVIDED PER REQUEST. HD CATH
PRESENT TO R.CHEST WALL. ABX BEING RECIEVED TO TREAT CELLULITIS. NO ACUTE
CHANGES. VSS/AFEBRILE. WCTM AND REPORT TO DAY RN.

## 2021-06-08 NOTE — NUR
SHIFT SUMMARY
 
PT HAD DIALYSIS THIS AM. PT WORKED WITH PHYSICAL THERAPY TODAY AND AMBULATED
IN [place]. PT SAT IN CHAIR FOR DINNER. THIS RN HAS ENCOURAGED PT TO BE MORE
ACTIVE. MEDICATED FOR PAIN PER EMAR X2. DRESSING TO LEFT LOWER LEG CHANGED
THIS AFTERNOON. NO ACUTE CHANGES THIS SHIFT. CALL LIGHT IN REACH. WILL
CONTINUE TO MONITOR AND REPORT TO ONCOMING RN.

## 2021-06-08 NOTE — NUR
SHIFT SUMMARY
ALERT, ABLE TO MAKE NEEDS KNOWN. MULTIPLE REQUESTS FROM STAFF. MANY FLIGHT OF
IDEAS. COOPERATIVE WITH CARE. ANSWERS QUESTIONS APPROPRIATELY. ENCOURAGED
MULTIPLE TIMES TO PARTICIPATE IN OWN CARE. C/O PAIN/DISCOMFORT TO BLE;
MEDICATED PER EMAR. DID NOT APPEAR TO REST WELL OVERNIGHT. DRSG CLEANSED AND
RE-DRESSED; MODERATE AMOUNT OF SEROSANGUINOUS DRAINAGE /c SLOUGH NOTED. NO
OTHER ACUTE CHANGES NOTED. VSS/AFEBRILE. BED IN LOWEST POSITION; ALARM ON.
CALL LIGHT AND BELONGINGS WITHIN REACH. CONTINUE WITH CURRENT PLAN OF CARE.
REPORT TO ONCOMING RN.

## 2021-06-09 NOTE — NUR
SHIFT SUMMARY
 
PT HAS BEEN SLEEPING A LOT OF THE SHIFT. MEDICATED FOR PAIN X1. DRESSING TO
LEFT LEG HAS BEEN CHANGED IN THE AM AND THIS EVENING. CHITO FRANKS FOR DR. PEDERSEN WAS IN TO SEE PT THIS EVENING AND PLANS FOR PT TO HAVE
REVASCULARIZATION PROCEDURE TOMORROW. PT TO BE NPO AFTER MIDNIGHT. NO ACUTE
CHANGES. CALL LIGHT IN REACH. WILL CONTINUE TO MONITOR AND REPORT TO ONCOMING
RN.

## 2021-06-10 NOTE — NUR
BLOOD SUGAR
 
PT'S BLOOD SUGAR 69 WHILE NPO AND D51/2 NS INFUSING @ 50 ML/HR. PT AWAKE,
TALKING TO STAFF. THIS RN CALLED DR. TENORIO AND NOTIFIED HIM OF PT'S BLOOD
SUGAR. ORDER TO INCREASE PT'S IVF RATE. RATE INCREASED TO 100ML/HR. WILL
CONTINUE TO MONITOR BLOOD SUGAR. CALL LIGHT IN REACH.

## 2021-06-10 NOTE — NUR
SHIFT SUMMARY
 
PT HAS BEEN NPO FOR REVASCULARIZATION OF LEFT LEG TODAY. PT CONTINUES TO BE
NPO, WAITING FOR PROCEDURE. DR. PEDERSEN SAW PT THIS EVENING AND PLANS TO DO
THE PROCEDURE TONIGHT. PT'S BLOOD SUGARS HAVE BEEN LOW AND WAS STARTED ON D5
1/2 NS AT 50ML/HR AND WAS INCREASED TO 100ML/HR DUE TO BLOOD SUGAR 69. PT'S
SUGAR NOW 79 LAST TIME CHECKED. PT HAD DIALYSIS THIS AM AND HAS BEEN SLEEPING
MOST OF THE SHIFT. PT HAS NOT COMPLAINED OF PAIN THIS SHIFT. BLOOD PRESSURE
HAS BEEN ELEVATED AND SCHEDULED MEDICATIONS GIVEN. NO ACUTE CHANGES AT THIS
TIME. CALL LIGHT IN REACH. WILL CONTINUE TO MONITOR AND REPORT TO ONCOMING RN.

## 2021-06-10 NOTE — NUR
NIGHT SHIFT SUMMARY
NO ACUTE CHANGES THIS SHIFT. PT AAOX3 WITH SOME INTERMITTENT
CONFUSION/FORGETFULNESS. PT SOMETIMES SPEAKS NONSENSICALLY ABOUT BIZARRE
THINGS. MEDICATED FOR PAIN/FEVER AT BEDTIME, PT HAS BEEN ABLE TO SLEEP MOST OF
THE NIGHT SINCE. HAS BEEN NPO SINCE MIDNIGHT IN PREP FOR REVASC OF LEFT LEG
LATER TODAY BY DR PEDERSEN. VSS, WILL CONTINUE TO MONITOR.

## 2021-06-11 NOTE — NUR
SHIFT SUMMARY- PT IS ALERT, PLESANT AND COOPERAIVE. HIS APPETITE IS POOR. HE
RECIEVED DIALYSIS TODAY AND TOLERATED WELL. HIS BLOOD SUGARS HAVE BEEN IN THE
200'S THIS SHIFT AND HAVE REQUIRED COVERAGE WITH HIS SS INSULIN. HE REFUSED OT
TODAY, BUT WORKED WITH PT. HE AMBULATED INTO THE HALLWAY. SHE RECOMENDED
HAVING HIM AMBULATE TO THE RESTROOM AND NOT USE THE BSC. HE SLEPT
INTERMITENTLY THROUGHOUT THIS SHIFT. HE HAD HIS BANDAGE CHANGED THIS SHIFT.
HIS BED IS IN THE LOW POSTION AND CALL LIGHT IS WITHIN REACH.

## 2021-06-11 NOTE — NUR
NIGHT SHIFT SUMMARY
DR PEDERSEN IN TO ASSESS PT AT START OF SHIFT AND CHECKED PULSES OF LEGS WITH
DOPPLER. DR PEDERSEN DETERMINED THAT IT WAS NON EMERGENT AND WOULD BE ABLE TO
DO REVASC PROCEDURE ON MONDAY DUE TO HIS SCHEDULE. PT ABLE TO EAT AFTER
DETERMINED THERE WOULD BE NO PROCEDURE. HELD HS DOSE OF SEMGLEE INSULIN AS PT
CBG . PT WAS HYPOGLYCEMIC PREVIOUS MORNING WITH CBG 60'S AFTER PREVIOUS
NIGHT CBG 'S. MEDICATED FOR PAIN OF LLE X2 WITH PERCOCET. VSS, WILL
CONTINUE TO MONITOR.

## 2021-06-12 NOTE — NUR
SHIFT SUMMARY
PT WOUND CARE COMPLETED THIS AFTERNOON. MEDICATED WITH PAIN MEDS PRIOR TO
THIS. PT STATES CONSISTANT 9/10 PAIN T/O DAY. MEDICATED FOR PAIN TWICE THIS
SHIFT. PT RELUCTANTLY GOT UP INTO CHAIR ONCE THIS SHIFT. PT TOLERATED SITTING
UP FOR 15 MINUTES BEFORE HE AMBULATED A SMALL DISTANCE WITH PHYSICAL THERAPY
AND INSISTED ON GOING BACK TO BED. PT HAS HAD A LOW GRADE FEVER T/O MOST THE
DAY. 99.4 THIS AFTERNOON. OTHER VITALS STABLE. NO OTHER CHANGES IN ASSESSMENT
AT THIS TIME. PT RESTING IN BED. CALL LIGHT IN REACH. ALTHOUGH PT PREFERS TO
CALL OUT INTO HALLWAY.

## 2021-06-12 NOTE — NUR
PT IS AAO X 2-3, ON RA. REPORTS HEAD AND L LEG PAIN, GOT PAIN MEDS X 2.
REPORTED NAUSEA AT HS AND GOT ZOFRAN. DECLINES STOCKINGS. BS . REPORTS
A LITTLE SOB THAT INCREASES WITH EXERTION, ON RA AT 93%. DRESSING ON L LE
C/D/I.

## 2021-06-12 NOTE — NUR
PT LYING IN BED, EATING A SANDWICH. NO APPARENT SIGNS OF DISTRESS. CALL LIGHT
IS IN REACH. BED ALARM IS ON. NO OTHER CHANGES THIS SHIFT.

## 2021-06-12 NOTE — NUR
CHANGED DRESSING ON L LE PER DR'S ORDERS. PT TOLERATED WELL. PT REPORTS PAIN
IN LLE, TOO SOON FOR PAIN MEDS, WILL CHECK WITH HIM AGAIN WHEN I CAN GIVE THEM
TO HIM IN ABOUT AN HOUR. PT REQUESTING CHEESE AND A SANDWICH. WILL BRING THOSE
TO HIM. NO OTHER APPARENT SIGNS OF DISTRESS. CALL LIGHT IS IN REACH. BED ALARM
IS ON.

## 2021-06-12 NOTE — NUR
6/11/21 2052 PT LYING IN BED, REPORTS PAIN IN HEAD OF 9/10 AND NAUSEA, GAVE
ZOFRAN AND PAIN MEDICATION, WILL EVAL FOR EFFECT. REPORTS A LITTLE SOB THAT
INCREASES WITH EXERTION, ON RA AT 93%. DRESSING ON LLE IS C/D/I. BS . NO
OTHER APPARENT SIGNS OF DISTRESS. CALL LIGHT IS IN REACH.

## 2021-06-12 NOTE — NUR
PT REQUESTED AND RECIEVED PAIN MEDS AND A VANILLA PUDDING. WILL EVAL FOR
EFFECT. NO OTHER APPARENT SIGNS OF DISTRESS. CALL LIGHT IS IN REACH. BED ALARM
IS ON.

## 2021-06-12 NOTE — NUR
PT LYING IN BED, EYES CLOSED, APPEARS TO BE RESTING. BREATHING IS EVEN,
UNLABORED. NO APPARENT SIGNS OF DISTRESS. CALL LIGHT IS IN REACH. BED ALARM IS
ON.

## 2021-06-12 NOTE — NUR
6/11/21 2304 PT REPORTS SOB, I CHECKED HIS O2 AND TO GET AN ACCURATE READING,
I HAD TO REMIND THE PT NOT TO HOLD HIS BREATH. WHEN HE WAS NOT HOLDING HIS
BREATH, HIS O2 ON RA WAS 93%. PT SEEMED SATISFIED WITH THIS. WILL CONT TO
MONITOR. NO OTHER APPARENT SIGNS OF DISTRESS. PT DENIES NEED FOR ANYTHING ELSE
AT THIS TIME. CALL LIGHT IS IN REACH. BED ALARM IS ON.

## 2021-06-13 NOTE — NUR
SHIFT SUMMARY
PT C/O OF A PAINFUL BOTTOM THIS AM. NEW FOAM PLACED TO BUTTOCKS, PT ENCOURAGED
AND DID GET OUT OF BED AND SAT IN A RECLINER FOR AWHILE. EGG CRATE PLACED TO
BED WHILE PT WAS UP. PT HAD DIALYSIS TODAY. C/O PAIN IN HIS SHIN T/O THE DAY.
WOUND CARE COMPLETED. NEW CREAM ORDERED FOR ITCH RELIEF AROUND THE WOUND. PT
STATES HIS BOTTOM FEELS MUCH BETTER THIS AFTERNOON. PT AGREEABLE TO
SUPPOSITORY IN RETURN FOR TWO WARM BLANKETS. PT CURRENTLY RESTING IN BED. CALL
LIGHT IN REACH.

## 2021-06-13 NOTE — NUR
SHIFT SUMMARY
ADMITTED FOR CELLULITIS OF LLE. FULL CODE. PLAN IS FOR REVASCULARIZATION ON
MONDAY W/DR PEDERSEN. DIALYSIS PT, DR ROLON IS RENAL CONSULT. THIS PT IS
CONFUSED AT TIMES DURING THIS SHIFT. THE PT DID PUSH HIS BANDAGE DOWN HIS LEG
AND SCRATCH AT HIS LEG WOUNDS UNTIL THEY BLED. I DID REWRAP HIS WOUNDS AGAIN
AND INSTRUCT HIM TO NOT SCRATCH THE WOUNDS. I DID MEDICATE HIM FOR PAIN 1X
THIS SHIFT.

## 2021-06-14 NOTE — NUR
SUMMARY
 
PT SITTING UP IN BED EATING DINNER, PT WAS SUPPOSED TO HAVE A REVASCULAR
PROCEDURE DONE TODAY, IT WAS CANCELLED, PT HAD AN EPISODE AROUND LUNCHTIME
WHERE HE STATED HE FELT HIS CBG WAS LOW, CHECKED AND HE WAS LOW, DR HANSEN
NOTIFIED AND ORDERS RECIEVED, POSSIBLE PROCEDURE TOMORROW, DRESSING TO THE LLE
DONE THIS MORNING, PT BONY WELL, PT MED PER EMAR FOR C/O PAIN, PT ABLE TO HAVE
A BM TODAY AS WELL, VSS, WILL CONTINUE TO MONITOR

## 2021-06-14 NOTE — NUR
PT with lt le wound from below knee to just above ankle NPO for planned
revasc surgery for lt le nonhealing wound. RT le has scattered dried scabs
PVD. Medicated x 2 for lt le pain with helpful effect.

## 2021-06-15 NOTE — NUR
PT with ESRD on dialysis continues with lt LE reported sunburn in diabetic.
possible revascularization procedure planned. PT not NPO possible add on to OR
schedule per report. PT with multiple calls frequently is demanding & calls
again as soon as we leave room. Medicated for pain lt le x 2 with percocet
5/325 mg tab. Good appetite, reported large bm yesterday. No void. Possible
cognitive delay, asks CNA to clean his private areas several times because he
thought he had been incontinent of bowel or bladder. Support offered.

## 2021-06-16 NOTE — NUR
SHIFT SUMMARY
 
PCU TRANSFER AT DINNERTIME. PATIENT SETTLED INTO ROOM. DENIES PAIN, NAUSEA,
AND SHORTNESS OF BREATH. PATIENT LABILE IN MOOD WITH TANGENTIAL SPEECH.
PATIENT RESTING IN BED WITH BLE ELEVATED.

## 2021-06-16 NOTE — NUR
pt laying in bed, very very needy, interupting care to have things done, and
is unable to wait. call light in reach.

## 2021-06-16 NOTE — NUR
shift summary
 
pt rested well through the night. alert and oriented, able to make needs
known. cooperative with plan of care. tele nsr. sats >90% on room air. voids
to urinal, no bm. pain x1. r groin site is c/d/i - no isgns of swelling or
bleeding. vss.
 
call light within reach, bed in lowest position. call light within reach.

## 2021-06-16 NOTE — NUR
report given to Grace FUENTES, pt taken to medical floor via bed with cna in
attendence with all belongings.

## 2021-06-17 NOTE — NUR
PT'S FRIEND MARIA LUISA IN TO VISIT PT. HE STATED THAT PT HAS NO ONE TO TAKE CARE OF
HIM AT HOME; NO ONE TO MANAGE MEDS, CHECK CBG, ETC. PT HAS NOT BEEN OOB FOR
AN UNKNOWN NUMBER OF DAYS D/T LLE PAIN. ACCORDING TO MARIA LUISA, PT'S NIECE LIVES IN
HIS HOME AND DEALS HEROIN. RELAYED MY CONCERNS TO [place] FU RN CM AND DR. HANSEN. DISCHARGE ON HOLD AT THIS TIME.

## 2021-06-17 NOTE — NUR
PATIENT DISCHARGED TO HOME VIA [facility] W/C VAN, DESPITE THIS
AUTHOR'S PROTESTS SINCE PATIENT HAS NO ONE AT HOME TO MANAGE HIS MEDICATIONS,
BLOOD GLUCOSE IS ONLY BEING MEASURED ONCE PER DAY (PATIENT SENT HOME WITH
SLIDING SCALE INSULIN), AND IS HAVING SOME DIFFICULTY WALKING. WORKED WITH
PHYSICAL THERAPY PRIOR TO D/C. LLE DRESSING CHANGED THIS MORNING AND THEN
AGAIN BY VASCULAR PA THIS AFTERNOON. WRITTEN RX GIVEN TO  FOR Max Rumpus AS
THEY ARE TO GO TO [facility] PRIOR TO HIS DELIVERY TO HOME. HOME HEALTH
TO CONTACT PT TOMORROW. IV SALINE LOCK REMOVED WITHOUT INCIDENT BY ADRIANO VASQUEZ RN. OFF UNIT AT 1840 VIA W/C. NO BELONGINGS LEFT BEHIND IN ROOM.

## 2021-06-17 NOTE — NUR
SHIFT SUMMARY
ADMITTED FOR CELLULITIS OF LLE. FULL CODE. REVASCULARIZATION PERFORMED BY
CONSULT DR PEDERSEN. RENAL CONSULT IS DR ROLON THIS IS A DIALYSIS PT. RT ARM
HAS A PERMACATH. 1000 ML FLUID RESTRICTION WAS NOT OBSERVED ON PREVIOUS SHIFT.
I HAVE ENSURED NURSING STAFF ARE AWARE OF THIS NEED. I DID HOLD CLONIDINE DUE
TO HR<60. I DID HOLD COZAAR DUE TO K+ >5.5. DRESSING ON LEG IS C/D/I. HE SEEMS
CONFUSED OR NONSENSICAL, ALLBEIT COOPERATIVE. EITHER WAY I WOULD BE CONCERNED
FOR HIM TO LIVE ALONE. HE CANNOT CARE FOR HIMSELF PROPERLY

## 2021-07-03 NOTE — NUR
7/3 @ 21:15 PT ARRIVES TO ICU RM 13 FROM ED. UPON ADMIT PT IS ALERT,
PLEASANTLY CONFUSED, C/O MILD BACK PAIN THAT IS RELIEVED WITH REPOSITINING.
CALLED DR ROLON TO INQUIRE ABOUT DIALYSIS, ORDER OBTAINED TO DIALYSE TONIGHT
FOR ABOUT 2 HOURS TO DECREASE POTASSIUM (K+ = 6.0 CURRENTLY,) INFORMED CHARGE
AND HOUSE SUPERVISOR, WHOM STATED THEY WILL CALL IN DIALYSIS NURSE. PT UNABLE
TO PROVIDE EFFECIENT HISTORY AT THIS TIME, PT TYPICALLY RESPONDS WITH "MY
CATHETER ITCHES" OR "I'M REALLY SICK." SOMETIMES WILL ANSWER MORE DIRECT
QUESTIONS. GAVE CLONIDINE FOR HYPERTENSION. WILL CONTINUE TO MONITOR.

## 2021-07-04 NOTE — NUR
Patient transfered to PCU 13. We transferred via wheelchair and one person
assist to bed, he has to be reminded to stand until he feels object on back of
legs. All his personal belonging went with him. 1400 meds given prior and
Jacome pulled prior to transfer and patient stated that he could use urinal.

## 2021-07-04 NOTE — NUR
SHIFT SUMMARY
 
PT RECEIVED DIALYSIS, PER DIALYSIS RN REMOVED 2 LITERS. PAIN RESOLVED AFTER 50
MCG OF FENTANYL. WHEN ASKING ABOUT PAIN, PT STATED HE ORIGINALLY HAD 9+/10
CHEST PAIN, CALLED DR CH FOR ORDERS. WHEN I WENT IN TO GIVE MEDICINE,
ASKED FOLLOW UP QUESTIONS, THEN REALIZED PT MAY HAVE BEEN SAYING
YES TO EVERYTHING
I WAS ASKING HIM. "ARE YOU IN PAIN, ARE YOU FREE OF PAIN RIGHT NOW, DO YOU
FEEL LIKE YOU'RE ON FIRE, DO YOU FEEL LIKE YOU'RE IN A FREEZER, DO YOU FEEL
LIKE SOMEBODY IS PULLING YOUR HAIR, DO YOU FEEL LIKE SOMEBODY IS TICKLING YOUR
FEET," ALL OF WHICH PT STATED YES TO. WHEN PT ASKED FOR PAIN MEDICINE FOR
SECOND TIME, I ASKED PT TO POINT TO HIS PAIN, TO WHICH HE POINTED TO THE
LATERAL MOST ASPECT OF HIS RIGHT SHOULDER, DESCRIBED AS 6/10, UNABLE TO GIVE
ANY DESCRIPTIVE QOALITIES, OTHER THAN "I'M SICK." ASSESSMENT IS AS CHARTED.
VSS. WILL CONTINUE TO MONITOR.

## 2021-07-04 NOTE — NUR
Received reprt from Bandar FUENTES. Patient awakens easily with care and verbal
stimuli. He is able to answer place, self, year and unable to state day. he
states intermitent pain in chest and all over. He is on 1L via NC and sats
96%. . Removed bilateral soft wrist restraints as he seems to follow
directions well. He asked for something to dring and tolerated ice water
without cough or distress. MAEW but weak. He is ST with PAC and hypertensive
prior to am meds. He has 16 Fr temp edgar and has minimal agustin urine.
Dialysis has called and stated he will get dialysis today. He has three IV's
that have been flushed and SL'd; 20ga L thumb, 20ga LW and 18ga RAC.

## 2021-07-04 NOTE — NUR
SHIFT SUMMARY:
 
RECEIVED PT FROM ICU THIS AFTERNOON. PT ARRIVES A&OX4, RESP EVEN AND UNLABORED
ON RA, SR ON MONITOR. PT ANSWERS QUESTIONS AND USES CALL LIGHT APPROPRIATELY,
TAKES MEDICATIONS WITHOUT DIFFICULTY. PT INCONTINENT OF STOOL, SAMPLE
COLLECTED, PERICARE PERFORMED AND CLEAN ATTENS PLACED.
AT THIS TIME, PT RESTING QUIETLY IN ROOM WITH CALL LIGHT WITHIN REACH. WILL
CONTINUE TO MONITOR AND TREAT ACCORDINGLY UNTIL CHANGE OF SHIFT.

## 2021-07-04 NOTE — NUR
Patient tolerated lunch which is still liq diet. He has attends in place and
aide in cleaning him up. Echo here to do procedure. Abx started and 
and covered with 6 units R insulin.

## 2021-07-04 NOTE — NUR
Patient ambulated with one assist to bedside cammode for liquid diahhrea x2
with some formed round balls. He is on RA and sats >90%. Dr hawkins by and made
PCU status. He has gone to Dialysis. Patient repeats his needs frequently and
states its because he sick.

## 2021-07-05 NOTE — NUR
Pt up at bedside and a bit mottled. He is very anxious about the loose
stools he has been having the past few weeks. We reviewed his medications some
recent changes that may be causeing untoward side effects. He says he has a
stable water supply and no changes there. He states that he eats our more and
most of the time eat food straight out of the can. He does his own bathing
unsure if his hand washing is adequate.
  He is griving his loss of independence. Tries to change the subject when
talking about his life. He states he has trouble with binders get nausea and
feels ill. States he has been on his binders. Did not ask about medical
marijuanna as CNA came in room. He is now on li-itor but unkown how long. He
has two topical medications for his wounds. Will see if he may be getting them
on his hands.
  Pt states he has been missing dialysis and loosing weight. He states they
change his dry weight when he goes in and try to pull more fluid.
  Pt his rigk for infection and sepsis. High risk for falls. He apprears
depressed and resigned high risk failure to thrive. Pt high risk for
mutisystem failure. Some symptoms reported concerning for diseased gallbldder
or undliying disease with weight loss and decline.
  Suggest poly pharmacy review looks like gabipentin stopped. Will visit again
to see if taking any alcolhol or drugs and having withdrawls.
  did not discuss code status as we have int past he gets to upset. suggest
strongly urging him to snf for wound care and rehab, medication managemnt and
nutritional support. pt high risk for readmission and missing dialysis if he
lives alone. will continue to follow for managment and support. Will update
dietician at [facility]

## 2021-07-05 NOTE — NUR
SHIFT SUMMARY
 
NO ACUTE CHANGES THIS SHIFT. PT ALERT, ORIENTED TO SELF, DATE, LOCATION.
SP02>92% ON 1L NC. TELEMETRY READS NSR, HR 70'S. PT C/0 OF BACK PAIN THIS
SHIFT. MEDICATED PER EMAR X1, HEATING PAD APPLIED AS WELL AS REPOSITIONING. PT
SLEPT T/O NIGHT. CALL LIGHTI N REACH WILL GIVE REPORT TO ONCOMING NURSE.

## 2021-07-06 NOTE — NUR
PT DISCHARGED FROM THE UNIT. IV REMOVED. MEDICATION FAXED TO PHARMACY.
DISCHARGE INSTRUCTIONS REVIEWED. PT LEFT WITH [facility] TO HOME.

## 2021-07-06 NOTE — NUR
NIGHT SHIFT SUMMARY
 PT A/O X3 WITH FORGETFULNESS. PT UP OF THE NIGHT, STATES HE USUALLY WATCHES
TV UNTIL 0300. PT ALSO HAS BEEN COMPLIANING OF "NOT FEELING WELL" IN GENERAL.
MEDICATED FOR INSOMNIA. MEDICATED FOR NAUSEA AND PAIN X2 TONIGHT. PT HAS NOT
HAD DIARRHEA TONIGHT. IMMODIUM AVAILBLE IF NEEDED. VSS. TELE SR IN THE 70'S
WITH PVC'S PER TELE TECH. PT CURRENTLY ASLEEP IN BED. CALL LIGHT WITHIN REACH,
BED ALARM ON. WILL CONTINUE TO MONITOR.

## 2021-07-14 NOTE — NUR
ADMIT
PT ADMITTED AT 1810. PT ORIENTED TO ROOM. CALL LIGHT IN REACH. LS CLEAR. HS
REGULAR. PT DENIES CP/PRESSURE/SOB. PT C/O HUNGER AND WANTS TO EAT HIS DINNER
TRAY. PT INFORMED THAT DINNER TRAY IS ON ITS WAY. L LEG WOUND BANGAGE INTACT.
PT RESTING IN BED. AWAITING HIS DINNER. VS REVIEWED.

## 2021-07-14 NOTE — NUR
DIALYSIS
CALLED ON-CALL DIALYSIS NURSE AND TOLD HER ABOUT THE ORDER TO DIALYIZE. SHE
ENDED UP CALLING DR. ROLON ABOUT THE ORDER. THEN CALLED THIS RN BACK AND
STATED THAT PATIENT WILL BE DIALYIZED IN THE MORNING.

## 2021-07-15 NOTE — NUR
SHIFT SUMMARY
ALERT, ABLE TO MAKE NEEDS KNOWN. COOPERATIVE WITH CARE. ANSWERS QUESTIONS
APPROPRIATELY. CONFUSED/FORGETFUL AT TIMES. POOR HISTORIAN. APPEARED TO REST
MUCH OF THE NIGHT. WILL LIKELY HAVE DIALYSIS THIS DAY 7/15/21. WOUND TO L
ANTERIOR SHIN CLEANSED AND RE-DRESSED. PHOTO DOCUMENTATION OF PRESENTING
WOUNDS IN CHART. NO ACUTE CHANGES NOTED OVERNIGHT. BED REMAINS IN LOWEST
POSITION. CALL LIGHT AND BELONGINGS WITHIN REACH. CONTINUE WITH CURRENT PLAN
OF CARE. REPORT TO ONCOMING RN.

## 2021-07-15 NOTE — NUR
PT admitted with confusion, unable to care for self. ESRD on hemodialysis PT
calls constantly with call bell. He wants shaved at shift change,
inappropriate requests but plasantly confused. Rounding reminders of hourly
visits to assess unmet needs work for approx 5 minutes then PT calling again
for warm blanket etc. Continue to assess & reorient.

## 2021-07-15 NOTE — NUR
PT AO AND COOPERATIVE OF CARE. PT HAD DIALYSIS TODAY AND WAS OUT OF ROOM FOR
THE MORNING. AFTER ARRIVING BACK PT REQUEST THINGS CONSTANTLY. PT WAS
INCONTENT OF BM AND HAS BED BATH WITH A SHEET CHANGE.MEPLEX APPLIED TO COCCYX
AREA DUE TO REDNESS FOR PROTECTION. BANDAGE WAS CHANGED ON L LEG PT TOLERATED
WELL. CALL LIGHT IS WITHIN REACH WILL CONTINUE TO MONITOR.

## 2021-07-16 NOTE — NUR
PT IS ALERT AND ORIENTED THOUGH NOT AWARE OF HIS LIMITATIONS PHYSICALLY. HE
HAD DIALYSIS THIS SHIFT, TOLERATED WELL. PT IS ABLE TO TRANSFER TO [facility] WITH
SBA. CALL LIGHT WITHIN REACH,NO ACUTE CHANGES. DRESSINGS CDI, WITH NO STRIKE
THROUGH NOTED.

## 2021-07-16 NOTE — NUR
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
on cognition stated by PT so he recieves trust for injury for life admitted
with AMS inability to care for self & lacking support system for safe dc plan
to his home in [facility]. PT lives alone has ESRD on hemodialysis & has
sustained injuries from multiple falls & has multiple wounds bilat LE which he
says he has not been recieving care for. He is diabetic & has blackened
scabbed toes bilat foot with some drainage & a large lt calf wound which he
says was a sunburn. PT calls multiple times with requests that are
inappropriate for acute care. He says he has a Niece who comes & goes at his
home but says his POA Gee has declined to provide care due to PT's unsafe to
live alone. Will make SW referral for safe DC plan

## 2021-07-17 NOTE — NUR
SHIFT SUMMARY
PT IS A 61 Y/O MALE, ADMITTED FOR HYPERKALIEMIA. HE IS A&O X 4, 1PA TO THE
BATHROOM. PT IS C/O BACK AND COCCYX PAIN, AND WAS MEDICATED WITH PRN TYLENOL.
NO C/O NAUSEA OR SOB. VITAL SIGNS STABLE. NO ACUTE CHANGES IN PT CONDITION
NOTED. WILL CONTINUE TO MONITOR AND TREAT PER EMAR UNTIL HAND OFF TO DAY SHIFT
RN.

## 2021-07-17 NOTE — NUR
SHIFT SUMMARY- PT IS ALERT, PLESANT AND COOPERATIVE. HE IS EATING AND
DRINKING WELL. HE IS USING THE BSC. HIS DRESSING IS C/D/I. HE SLEPT MUCH OF
THIS SHIFT. HE IS RECIEVING PRN TYLENOL. HIS BLOOD SUGARS HAVE BEEN ELEVATED
AND REQUIRED COVERAGE THIS SHIFT. HIS BED IS IN THE LOW POSITION AND CALL
LIGHT IS WITHIN REACH.

## 2021-07-18 NOTE — NUR
Shift Summary:
 
Pt alert, oriented. Attended dialysis session today, medicated with Tylenol
when painful. Pt reports liking his larger portions of food and reports
satisfaction with meals. No complaints, will report to oncoming RN.

## 2021-07-18 NOTE — NUR
SHIFT SUMMARY
PT IS A 59 Y/O MALE, ADMITTED FOR HYPERKALEMIA. HE IS A&O X 3, FORGETFUL AT
TIMES, AND 1PA IN THE ROOM. HE WAS MEDICATED FOR COCCYX AND BLE PAIN 2X WITH
PRN TYLENOL. NO C/O NAUSEA OR SOB. VITAL SIGNS STABLE. NO ACUTE CHANGES IN PT
CONDITION NOTED. WILL CONTINUE TO MONITOR AND TREAT PER EMAR UNTIL HAND OFF TO
DAY SHIFT RN.

## 2021-07-19 NOTE — NUR
SHIFT SUMMARY
PT IS A 59 Y/O MALE, ADMITTED FOR HYPERKALEMIA. HE IS A&O X 3, FORGETFUL AT
NIGHT, ANXIOUS AT TIMES AND VERY FREQUENTLY USES THE CALL LIGHT. PT IS 1PA IN
THE ROOM. HE WAS MEDICATED ONCE FOR PAIN WITH PRN TYLENOL. VITAL SIGNS STABLE.
TELE SHOWED NSR IN THE 60S. NO ACUTE CHANGES IN PT CONDITION NOTED DURING THE
NIGHT. WILL CONTINUE TO MONITOR AND TREAT PER EMAR UNTIL HAND OFF TO DAY SHIFT
RN.

## 2021-07-20 NOTE — NUR
PATIENT HAS BEEN COOPERATIVE WITH STAFF THIS SHIFT. NO REQUESTS FOR PAIN
MEDICATION. DRESSING CHANGE TO LLE/FOOT PER ORDERS DONE THIS MORNING, PATIENT
TOLERATED WELL. BP TENDS TO RUN ELEVATED, ALL OTHER VITALS APPEAR TO BE
STABLE. SOME CONFUSION/FOGETFULNESS NOTED. PATIENT COMPLETED DIALYSIS SESSION
THIS MORNING AND IS NOW RESTING IN BED. CALL LIGHT WITHIN REACH.

## 2021-07-21 NOTE — NUR
NIGHT SHIFT SUMMARY
NO ACUTE CHANGES THIS SHIFT. PT AAOX4 AND PLEASANT/COOPERATIVE. MEDICATED FOR
PAIN OF L LEG AT BEDTIME WITH TYLENOL. LLE DRESSING C/D/I. PT HAS SLEPT OFF/ON
THROUGH THE NIGHT. VSS, WILL CONTINUE TO MONITOR.

## 2021-07-21 NOTE — NUR
PATIENT'S AFTERNOON BLOOD SUGAR 369; ADMINISTERED ORDERED 10units HUMALOG AND
NOTIFIED DR MCCAIN. NO ADDITIONAL ORDERS GIVEN.

## 2021-07-21 NOTE — NUR
PATIENT HAD HIS THIRD DAY OF DIALYSIS TODAY AND SEEMED TO TOLERATE WITHOUT
COMPLICATION. PATIENT WAS COMPLAINING OF NAUSEA THIS MORNING AND RESPONDED
WELL TO IV ZOFRAN. DRESSING CHANGE TO LLE AND L FOOT; PATIENT TOLERATED WELL.
PATIENT CALLS APPROPRIATELY FOR STAFF ASSIST AS NEEDED. VITALS HAVE REMAINED
STABLE THIS SHIFT. NO ACUTE CHANGES TO REPORT OF AT THIS TIME. CALL LIGHT
WITHIN REACH.

## 2021-07-22 NOTE — NUR
Clarification relating to testamentary capacity requested by the [facility]
hospitalist, Dr Abdifatah Valdez. Concerns have been expressed about the
principals ability to safely care for himself, demonstrate medical decision
making insight, and comply with a reasonable course of treatment. In light
of these circumstances, it has been suggested that the patient could benefit
significantly from tribunay appointed guardianship support. While this
fact is not in dispute, and the principals quality of recovery, clinical
state, and domestic situation would no doubt be improved by such an action,
the ultimate objective must be to first ascertain the degree to which the
principal possesses adequate mentation or capacity to make independant
choices. To this end, If Dr Valdez certifies that the patient is not
elegible for a two MD hold arrangement, and that the he has sufficiently
exhibited the necessary qualities constituting proper decisional
capacity, then the principal can be discharged despite his readmission
history and risk. Namely it must be established or confirmed that the
patient (1) can clearly indicate his preffered course of therapy, (2) can
grasp the fundamental meaning of the information being conveyed to him by the
physician, (3) can acknowledge the consequences of forgoing or proceeding
with any given treatment option, and (4) can engage in active reasoning about
his choices. If further directional guidance would be helpful please contact
the hospital ethicist.
 
Thank you for this consult.
 
Liu Wick Th.D.

## 2021-07-22 NOTE — NUR
DISCHARGE SUMMARY
PATIENT DISCHARGED TO HOME WITH HOME HEALTH. PATIENT ALERT AND ORIENTED THIS
SHIFT. PATIENT IMPATIENT WITH WAITING FOR TRANSPORTATION. PATIENT SITTING UP
IN BED THROUGHOUT THIS SHIFT. IV REMOVED PRIOR TO DISCHARGE. PATIENT
WITH DIALYSIS FOR THE PAST THREE DAYS, NO DIALYSIS NEEDED TODAY PRIOR
TO DISCHARGE. PATIENT PROVIDED WITH DISCHARGE AND MEDICATION INSTRUCTIONS.
PATIENT TRANSPORTED VIA WHEELCHAIR VAN.

## 2021-07-22 NOTE — NUR
SUMMARY
NO NEW CHANGES NOTED THIS
SHIFT. PT CBG WAS ELEVATED THIS SHIFT. PT INFORMED THAT
SNACKING WOULD BE LIMITED, PT AGREED. PT HAD A EPISODE OF NAUSEA AND WAS TX AS
PER EMAR. PT CURRENTLY AWAKE AND WATCHING TV. CALL LIGHT IN REACH AND BED
ALARM ON.

## 2021-07-27 NOTE — NUR
SHIFT SUMMARY;
 
ASSUMED CARE AT 0700, REPORT FROM JAYY. A/MARCELO/OX4 DURING SHIFT. DIALYSIS
COMPLETE TODAY. NEW DRESSING PLACED ON LEFT LEG WOUND INCLUDING VASILINE
GAUZE, TELFA, AND KERLEX. REPORT BUTTOX PAIN DUE TO FALL LAST NIGHT. PINK AREA
NOTED AT COCCYX, MEPILEX PLACED. PERMICATH TO RIGHT CHEST WALL. REPOSITIONS
SELF IN BED AS NEEDED. ATTENDS IN PLACE. NO URINE DURING SHIFT. PT STATES THIS
IS NORMAL FOLLOWING DIALYSIS. ABD NOTED TO BE DISTENDED AND FIRM. ASSESSED BY
DR. LY. 2L O2 VIA NC TO MAINTAIN SATS OF 92%. INSULIN COVERAGE PER EMAR.
WILL CONTINUE TO MONITOR AND TREAT UNTIL CHANGE OF SHIFT.

## 2021-07-28 NOTE — NUR
TRANSFER NOTE
 
PT MEDICAL W/ TELE STATUS. A&O X4 W/ SOME ANXIOUSNESS. PT VSS. SPO2 > 92% ON
2L NC. TELEMETRY SHOWS SR, HR 70's. REPORT GIVEN TO MEDICAL FLOOR RN ACCEPTING
CARE OF PT. PT TRANSPORTED TO  358 VIA BED @ APPROX 0015.

## 2021-07-28 NOTE — NUR
SUMM- PT A/O X3, CALM AND COOPERATIVE MOST OF THE DAY. HAS POOR COPING AND
GETS UPSET OVER DETAILS OF CARE. PT HAD DIALYSIS TODAY AND THEY PULLED 2L OFF.
PT HAS ABTEN DISTENDED ABD THAT DR'S ARE AWARE OF AND ASSESSED THIS AM. PT HAD
A BM LAST 7/26. TOLERATING FOOD AND FLUIDS. HAD OXYCODONE BID FOR PAIN AND
TYLENOL ONE OTHER TIME. STATES CONTROL OF PAIN  IN LEGS. DRESSING CHANGED WITH
XEREVORM THIS AM BEFORE DIALYSIS BEFORE [facility]. SILVIDENE DRESSING
CHANGE TO BLE AT 1730 THIS PM.

## 2021-07-28 NOTE — NUR
PT TRANSFERRED FROM PCU.
A/OX4. VSS ON 2L O2. DENIES CP/SOB. SINUS RHYTHM ON TELE. C/O LOWER BACK
PAIN, OXYCODONE GIVENN W/ GOOD EFFECT. PT NEEDING ASSISTANCE W/ URINAL AND
REPOSTIONING IN BED. CALAZYME APPLIED TO BUTTOCKS, PET PT REQUEST ALLEVYN
REMOVED. LEG DRESSING CDI. PT SLEEPING B/W CARE. USING CALL LIGHT TO MAKE
NEEDS KNOWN.

## 2021-07-29 NOTE — NUR
CALLED   AT 1553 TO NOTIFY HER OF PT'S INCREASED LETHERGY AND TEMP. ORDER
FOR CBC, PROCALCITONIN, AND NARCAN. LAST OXY AT 0630 THIS AM AND PT HAD BEEN
AWAKE AND INTERACTIVE, HIS NORMAL SELF UP UNTIL LUNCH TIME. DECLINED LUNCH AND
NAPPED. RN AWOKE PT AT 1500 FOR MEDS NOTICING INCREASED SLEEPINESS. ATTEMPETD
TO AROUSE AND REPOSITIONED, BUT PT NEVER WOKE UP. IS STILL ABLE TO FOLLOW
COMMAND TO SQUEEZE HANDS AND STICK OUT TONGUE, KEEGAN AND NO DEVIATION OF
TONGUE. 1700 RN NOTED WBC -18, CALLED DR PACKER, NEW ORDERS FOR CXR, UA, BLOOD
CX. CALLED GRADY TO DAILY BLOOD CX FROM [facility]. UNAVAILABLE FOR ONE HOUR,
STUCK IN ED. (1800)- WILL WAIT TO ADMINISTER ABX. PT CONT SLEEPY AND FEBRILE,
SATS 93% 2L, RR 28. WILL ANCA CLOSELY.

## 2021-07-29 NOTE — NUR
SUMMARY- PT STARTED OUT THE DAY FEELING VAGUE COMPLAINTS OF NEEDING MORE
OXYGEN ALTHOUGH SATS MID 90'S ON 2L. LUNGS CLEAR, DIM IN BASES. TOLERATED
BREAKFAST, WAS ALERT AND TALKATIVE. SAT AT EDGE OF BED FOR LUNCH BUT LACKED
STRENGTH AND LAID BACK DOWN. SLEPT UNTIL 1500 WHEN RN AWOKE PT, HARD TO AROUSE
WITH CONT VERBAL AND TACT TO KEEP AWAKE. ABLE TO OPEN EYES AND ANSWER SIMPLE
QUESTIONS, KEEGAN,  TO COMMAND EQUAL, NO TONGUE DEVIATION. REMAINS
SOMNULANT, CALLED MD, ORDER FOR CBC, UA, CRX, ALL COMPLETE. BLOOD CX AFTER CBC
SHOWED WBC 18- WAITED FOR DILANDA FROM DIALYSIS TO DRAW BLOOD FROM [place]
-CATH. AND PERIPH X2 ALL COMPLETE AT 1930. PT REMEINS LETHARGIC BUT AROUSABLE
SATS 94% O2 2L. REPORT TO ALEXIS- HE WILL START ABX NOW.

## 2021-07-29 NOTE — NUR
NIGHT SHIFT SUMMARY
 PT A/O X4 WITH FORGETFULNESS. PT IS EASILY IRRITATED AND REQUESTS STAFF TO DO
TASKS THAT PT IS CAPABLE TO DO HIMSELF SUCH AS LIFTING UP HIS LEGS OR PULLING
HIS BLANKETS UP. MEDICATED FOR BILATERAL LEG PAIN X1 PER EMAR. VITALS STABLE.
REPOSITIONED THROUGHOUT THE NIGHT. DRESSING ON BILATERAL LEGS C.D.I. CALL
LIGHT WITHIN REACH, BED ALARM ON. WILL CONTINUE TO [facility].

## 2021-07-30 NOTE — NUR
Ethics consultation service requested and provided. Case history and social
and medical complexities reviewed with care management. The principal is
reported to be at elevated risk of readmission, unable to independently
and safely manage himself at home, and persistently non-compliant. With his
mentation being of questionable quality, it is supposed that the etiology of
the aforementioned disposition is attributable to insufficient capacity. In
the absence of contradicting clinical evidence or testimony, the
recommendation is to initiate the guardianship process and allow the court
appointed visitor to adjudicate the case and either confirm or invalidate the
principals testamentary ability.
 
Please see my previous note from 7/22/21 which explicitly details the
best practice process for ascertaining capacity.
 
 
Thank you for this consult.
 
Liu Wick Th.D.

## 2021-07-30 NOTE — NUR
SHIFT SUMMARY
PT SLEEPING AT START OF SHIFT. WOKE FOR CARE AND ABLE TO EAT BREAKFAST BEFORE
GOING TO DIALYSIS. L ARM SWELLING WITH SOME WEEPING FROM WRIST. DRSG, GOWN AND
LINEN CHANGED. PT TOLERATED DIALYSIS WELL. DR LY HERE A COUPLE OF TIMES
TODAY TO SEE PT. DRSG'S TO BLE'S CHANGED WHEN DR LY HERE SO HE COULD
ASSESS THEM. PT WANTING WARM BLANKETS AFTER DIALYSIS; GIVEN BY CNA. TEMP THEN
TAKEN SHOWING INCREASE; SEE CHART. TYLENOL GIVEN AND BLANKETS REMOVED. PT
REPOSITIONED AND FLOATED, THRU OUT THE DAY TO KEEP OFF BUTTOCKS. +BCX'S X3
TODAY. DR LY AWARE. PHARMACY NOTIFIED AS WELL, REPORTING PT ON APPROPRIATE
ABX. CARE MANAGER HERE TODAY AS WELL, WITH HOSPITALIST; PT STILL WAITING STATE
GUARDIANSHIP AND PLACEMENT. RESTING QUIETLY AT THIS TIME. CALL LT IN REACH.

## 2021-07-30 NOTE — NUR
SHIFT SUMMARY:  PT IS ALERT AND ORIENTED BUT RATHER LETHARGIC, SLEPT THROUGH
MOST OF THE SHIFT.  PT IS A 1-2 ASSIST, NOT OUT OF BED THIS SHIFT.  PT CALLS
APPROPRIATELY.  PT TOO LETHARGIC TO TAKE ORAL MEDICATIONS THIS NIGHT, HELD.
PT DENIES PAIN, NAUSEA, VOMITING, AND SOB.  BED IN LOW POSITION, CALL LIGHT
WITHIN REACH.  WILL CONTINUE TO MONITOR AND REPORT TO DAY NURSE.

## 2021-07-31 NOTE — NUR
DR MCDUFFIE CAME AND REMOVED THE DIALYSIS CATHETER TO THE PT'S R CHEST WALL, PT BONY
WELL, PRESSURE APPLIED FOR 10 MINUTES, SMALL AREA CLOSED WITH A FEW SUTURES

## 2021-07-31 NOTE — NUR
NO CHANGES OVERIGHT. EASILY AGITATED.  MUCH MORE ALERT AND AWAKE THAN THE
PREVIOUS DAY/NIGHT. LEFT WRIST STILL OOZING SEROSANGUINOUS FLUID.  MEPILEX IN
PLACE TO PROTECT

## 2021-07-31 NOTE — NUR
SUMMARY
 
PT SITTING UP IN BED WATCHING TV, PT HAD DIALYSIS TODAY, SURGERY CONSULT FOR
DIALYSIS CATH REMOVAL, AWAITING SURGEON, PT MED PER EMAR FOR PAIN, DRESSINGS
CHANGED, PT BONY WELL, VSS, WILL CONT TO MONITOR

## 2021-08-01 NOTE — NUR
SUMMARY
 
PT SITTING UP IN BED, PT HAS BEEN ANXIOUS AND CALLING OUT FREQUENTLY TODAY FOR
MANY REQUESTS, PT MED PER EMAR FOR PAIN, DRESSINGS CHANGED, PT BONY WELL,
FRIEND CAME IN TO VISIT, VSS, WILL CONT TO MONITOR

## 2021-08-01 NOTE — NUR
YNES WAS QUITE WITHDRAWN OVERNIGHT.  ALSO REMOVED HIS 02 AT LEAST TWICE,
AND BECAME VERY NON COMMUNICATIVE AROUND BEDTIME.  AFTER TAKING MOST OF HIS
MEDS AND HIS PAIN PILLS, HE WOULD BARELY SWALLOW HIS WATER, AND THIS RN DID
NOT FEEL SAFE GIVING ANY FURTHER ORAL MEDICATIONSL  THROUGHOUT THIS ENTIRE
EPISODE,  PATIENT VITAL SIGNS REMAIN STABLE.  MEDS MISSED WERE CLONIDINE,
LOSARTAN AND LIPITOR.  THIS MORNING, PATIENT IS AGAIN AWAKE, ALERT
AND COOPERATIVE WITH CARE

## 2021-08-02 NOTE — NUR
SHIFT SUMMARY:
PT A&O; HX TBI; LABILE; OCC UNCOOPERATIVE WITH CARE. MEDICATED FOR PAIN PER
EMAR. DIALYSIS PATIENT; DIALYSIS CATHETER D/C'd 07/31; NO DIALYSIS ACCESS. CHERELLE
PERFORMED THIS SHIFT (DR VILLALTA); NO VEGETATIONS NOTED. VANCO CONTINUING.
REPORT GIVEN TO ONCOMING RN.

## 2021-08-02 NOTE — NUR
1605 FELIZ JUNIOR RN AND ECHO TECH AT THE BEDSIDE. BROUGHT IVF, MONITOR,
FACE MASK COMPATABLE WITH CHERELLE PROBE, SEDATION/REVERSAL
MEDICATIONS, SUCTION SETUP, AND
AMBU BAG. PATIENT IS AWAKEN FROM SLEEPING. TBI PATIENT, SO BASELINE IS MINIMAL
CONVERSATION, FOLLOWS SIMPLE COMMANDS AND ABLE TO TELL ME HIS NAME AND THAT HE
WAS NOT IN PAIN.  I PLACED THE MONITOR ON THE PATIENT TO MONITOR DURING
CONSCIOUS SEDAITON DURING THE CHERELLE BEING PERFORMED BY DR. VILLALTA AT THE
BEDSIDE BECAUSE RON IS ON MRSA CONTACT ISOLATION.
DR. HAIDER ARRIVES AND TIME OUT PERFORMED. CONSENT FOR PROCEDURE AND BLOOD
ARE ON THE CHART AND VERIFIED. LABS, HISTORY, NKDA, AND BASELINE VS ARE ALL
REVIEWED. DR. VILLALTA AGREES AND ORDERED TO BEGIN SEDATION. 1 MG IV VERSED
AND 25 MG IV FENTANYL ARE GIVEN AS ORDERED AN FACE MASK IN PLACE WITH BITE
BLOCK. THROAT WAS PREVEIOUSLY NUMBED WITH XYLOCAINE SPRAY X 2 BY THE ECHO
TECH. PATIENT WAS UNABLE TO FOLLOW COMMANDS FOR THE VISCOUS LIDOCAINE (TO
GARGLE AND SWALLOW) SO THIS WAS NOT PERFORMED. DR. VILLALTA AWARE. CHERELLE BEGUN
AT 1618 AND BUBBLE STUDY PERFORMED PER DR. ELAINE ORDER. CHERELLE COMPLETED AT
1622 AND CHERELLE PROBE WAS REMOVED. THE PATIENTS MOUTH WAS SUCTIONED AND THE
PATIENT WAS MONITORED FOR 1 HOUR POST PROCEDURE. VVS. OXYGEN BACK TO NC @ 2LPM
(BASELINE AT START OF PROCEDURE).  SEE SEDATION RECORD. SBAR GIVEN TO BEDSIDE
RN.  FELIZ JUNIOR RN 1999

## 2021-08-02 NOTE — NUR
SLIGHT ELEVATED TEMP
SLIGHT ELEVATED TEMP OF 99.7 VIA TEMPORAL. PT HAS MULTIPLE BLANKETS ON AT THIS
TIME. BLANKETS PULLED OFF, DRAW SHEET IN PLACE. ROOM TEMP TURNED DOWN. PT
DROWSY EASILY AROUSBLE TO SOUND. PT HAS A PROCEDURE EARIER. BED ALARM ON, CALL
LIGHT WITHIN REACH, WILL CONTINUE TO MONITOR.

## 2021-08-02 NOTE — NUR
SHIFT SUMMARY
PT SLEPT A LOT OF THE FIRST PART OF THE EVENING. WOKE UP CLOSE TO 0400
COMPLAINING OF PAIN IN LLE. MEDICATED PT BEFORE BED WITH PRN BID ROXICODONE
AND WITH TYLENOL WHEN HE WOKE EARLY THIS AM. PT IS CHILD LIKE. SLOW TO
RESPOND. VERY PARTICULAR ABOUT WHAT HE WANTS. BLOOD PRESSURE AND HEART RATE
ELEVATED THIS EVENING. IMPROVED AFTER BEDTIME CARDIAC MEDS. ELEVATED AGAIN
THIS AM. PRN APRESOLINE GIVEN. DRESSINGS TO BLE'S AND MEPILEX TO LENNY C/D/I. PT
DID NOT EAT DINNER BUT DRANK NEPHRO SUPPLEMENTAL DRINK. SMALL FOAM DRESSING TO
PERMACATH REMOVAL SITE NOTED. PER REPORT PT HAS NOT HAD A BM X 5 DAYS.
SCHEDULED BOWEL CARE AND SUPPOSITORY GIVEN. STILL NO BM THIS EVENING. BLOOD
CULTURES CONTINUE TO COME BACK POSITIVE. PT RESTING IN BED AT THIS TIME. WILL
CONTINUE TO MONITOR AND REPORT TO DAY RN.

## 2021-08-03 NOTE — NUR
SHIFT SUMMARY:
NO ACUTE EVENTS TO REPORT THSI SHIFT, PT LETHARGIC; COOPERATIVE WITH CARE.
ESRD NEEDING DIALYSIS; NO DIALSIS ACCESS R/T POSITIVE BLOOD CULTURES;
INTERVENTIIONAL RADIOLOGY CONSULT (DR PEDERSEN) THIS SHIFT; AWAITING NEGATIVE
BLOOD CULTURES; DR ROLON FOLLOWING. REPORT GIVEN TO ONCOMING RN.

## 2021-08-03 NOTE — NUR
NIGHT SHIFT SUMMARY
 PT HAD BEEN DROWSY AND AROUSABLE TO VOICE ALL NIGHT. REPOSITIONING PROVIDED
THROUGHOUT THE NIGHT. ALL PO MEDS HELD LAST NIGHT DUE TO DROWZINESS AND RISK
OF ASPIRATION. HYPERTENSIVE THIS MORNING. HYDRALAZINE IV GIVEN PER EMAR. NO PO
FLUID INTAKE TONIGHT AND NO URINE OUTPUT. CALL LIGHT WITHIN REACH, BED ALARM
ON, WILL CONTINUE TO MONITOR.

## 2021-08-04 NOTE — NUR
SHIFT SUMMARY
 
PATIENT MEDICATED X2 FOR PAIN THIS SHIFT. DENIES NAUSEA AND SHORTNESS OF
BREATH. MAINTAINING OXYGEN SATURATION ABOVE 90% ON HOME RATE OF 4L/NC. PATIENT
LETHARGIC AND CONFUSED THIS MORNING, CRITICAL HIGH POTASSIUM OF 6.8. NPO FOR
PROCEDURE AND PO MEDS HELD FOR ASPIRATION RISK.  NEW DIALYSIS CATHETER PLACED
THIS AFTERNOON. PATIENT RECEIVING BEDSIDE HD AT THIS TIME. PATIENT MORE ALERT
THIS EVENING BUT HAS EPISODES OF CONFUSION AND IRRITABILITY.

## 2021-08-04 NOTE — NUR
RECEIVED REPORT AND ASSUMED CARE OF PT. HE IS LYING IN BED WITH HIS EYES
CLOSED WITH EVEN, UNLABORED RESPIRATIONS. HE OPENS HIS EYES TO VOICE, RESPONDS
TO QUESTIONS IN SHORT SENTENCES BEFORE CLOSING HIS EYES AGAIN. CHUY.

## 2021-08-05 NOTE — NUR
Received call from Pt's primary RN Grace. Discussed case and concrens. Pt
appears to be more somnolent and more confused today. Due to Pt's medical
history, comorbidities, and frequent hospitalizations, Pt may benefit from
goals of care discussion.
 
Pt resting in bed with his eyes opened upon arrival. Pt does not respond
verbally during my visit. When asked how he feels he turns his head side to
side indicating not feeling well. Attempted to assess Pt's orientation. When
asked if he know where he is, Pt nods his head up and down indicating yes.
When asked to verbally elaborate Pt remains silent. Pt appears lethargic and
frail. Significant swelling noted on left upper extremity. Ended visit to
allow Pt to rest.
 
Spoke with Care Coordinator Sally and discussed case. Sally reports
public guardian Jason applied for emergency guardianship on Friday. She
exprects to hear from her soon and will attempt to contact her tomorrow.
 
Palliative Care will remain available for supportive and therapeutic visits.

## 2021-08-05 NOTE — NUR
SHIFT SUMMARY:
RENÉ AROUSES TO VOICE AND RESPONDS APPROPRIATELY. VSS, BP IMPROVED AFTER
DIALYSIS AND IV HYDRALAZINE. POWERGLIDE TO EMMA PATENT. PERMACATH TO RU CHEST
WALL. HE IS LYING IN BED WITH THE CALL LIGHT IN REACH. WILL REPORT TO DAY
SHIFT RN.

## 2021-08-05 NOTE — NUR
SHIFT SUMMARY
 
PATIENT MEDICATED X2 FOR PAIN THIS SHIFT. DENIES NAUSUEA AND SHORTNESS OF
BREATH. MAINTAINING OXYGEN SATURATION ABOVE 92% ON HOME RATE OF 4L/NC.
PATIENT VERY DROWSY TODAY, UNABLE TO HOLD CONVERSATION WITHOUT FALLING ASLEEP.
MORE ALERT IN AFTERNOON. DIALYSIS TODAY.  FAILED SWALLOW EVAL THIS MORNING.
ALL NPO MEDICATIONS HELD. DIETARY CONSULTAION FOR POSSIBLE TUBE/DOBHOFF
FEEDING.   CONSULTED FOR MRSA BACTEREMIA. WOUND CARE COMPLETED ON
BLE. VENOUS DOPPLER OF LEFT ARM REVEALED SUPERFICIAL BASILIC CLOT, DOCTOR IS
AWARE.  POSITIVE BLOOD CULTURES REPORTED TODAY. PALLIATIVE CARE CONSULTED.
PATIENT CONFUSED BUT ABLE TO COMMUNICATE NEEDS AT TIMES.

## 2021-08-06 NOTE — NUR
SHIFT SUMMARY-
PT. ALERT WITH INTERMITTENT CONFUSION DURING THE NIGHT. NPO DUE TO FAILED
SWALLOW EVAL. PT. UPSET ABOUT NOT BEING ABLE TO EAT OR DRINK LAST NIGHT. C/O
GENERALIZED PAIN, MEDICATED X1 WITH GOOD EFFECT. REPOSITIONED FOR COMFORT/PRN.
FOAM DSG APPLIED TO SACRUM, TOLERATED WELL. VSS. CALL LIGHT WITHIN REACH AND
SIDE RAILS UPX2. WILL CONT TO MONITOR.

## 2021-08-06 NOTE — NUR
SHIFT SUMMARY
PT AOX2; CONFUSED AT TIMES. PT MEDICATED FOR PAIN PER EMAR. PT IS NOW
TOLERATING SOFT DIET. PT SATS ABOVE 90S. REFUSED SOME MEDS THIS AM AFTER
SPEECH EVAL. BED IS IN THE LOWEST POSITION AND CALL LIGHT WITHIN REACH

## 2021-08-07 NOTE — NUR
RESTED WELL FOR ABOUT 5-6 HOURS LAST NIGHT.  PAIN CONTROLLED WELL WITH ONE
OXYCODONE.  ONLY ONE VOID LAST NIGHT, BUT HE HAD BEEN ST CATHED FOR >THAN
400 ML JUST BEFORE SHIFT CHANGE.  WOUNDS ON ARMS IN VARIOUS STAGES OF
HEALING.  RENÉ WAS PLEASANT AND COOPERATIVE WITH CARE

## 2021-08-07 NOTE — NUR
SHIFT SUMMARY:
NO ACUTE EVENTS TO REPORT THIS SHIFT. PT A&O X2-3; OCC CONFUSION; CALM AND
COOPERATIVE WITH CARE. MEDICATED FOR PAIN PER EMAR. DISALYSIS TO DAY; PT
TOLERATED WELL. O2 CONINUING @ 4L. IV ABX CONTINUING R/T POSITIVE BLOOD
CULTURES. WCTM.

## 2021-08-08 NOTE — NUR
SHIFT SUMMARY
PATIENT ALERT AND ORIENTED TO SELF ONLY. WAS QUITE CONFUSED OVERNIGHT. HAD NO
COMPLAINTS OF PAIN OR SHORTNESS OF BREATH. REMAINS ON 4 LITERS O2 VIA NASAL
CANULA. NO ACUTE ISSUES NOTED. POWERGLIDE PATENT AND FLUSHED. BED IN LOWEST
POSITION WITH WHEELS LOCKED AND ALARM ON. CALL LIGHT WITHIN REACH. REPORT
GIVEN TO ONCOMING RN.

## 2021-08-08 NOTE — NUR
SHIFT SUMMARY:
NO ACUTE CHANGES TO REPORT THIS SHIFT. PT A&O; COOPERATIVE WITH CARE; OCC
FORGETFUL/CONFUSED. MEDICATED FOR PAIN PER EMAR. NO DIALYSIS TODAY; PERMACATH
DRESSING C/D/I. PT COMPLAIN OF CONSTIPATION; BOWEL CARE GIVEN c NO RESULTS;
ENEMA GIVEN WITH POOR EFFECT; ONCOMING RN NOTIFIED OF AVAILABILITY OF HS
SUPPOSITORY. AWAITING GUARDIANSHIP & PLACEMENT. REPORT GIVEN TO ONCOMING RN.

## 2021-08-09 NOTE — NUR
SHIFT SUMMARY
PATIENT ALERT AND ORIENTED X2. HAD NO COMPLAINTS OF PAIN OR SHORTNESS OF
BREATH. SLEPT WELL OVERNIGHT. NO ACUTE ISSUES NOTED. POWERGLIDE PATENT AND
FLUSHED. BED IN LOWEST POSITION WITH WHEELS LOCKED AND ALARM ON. CALL LIGHT
WITHIN REACH. REPORT GIVEN TO ONCOMING RN.

## 2021-08-09 NOTE — NUR
SHIFT SUMMARY
 
PT IS AO. PT MEDICATED FOR PAIN PER EMAR. PT C/O LLQ ABD PAIN. PT MEDICATED
WITH SUPPOSITORY AND ENEMA FOR NO BM SINCE 7/29 AND PT PRODUCED SMALL/SCANT BM
TODAY. PT IS 2 MAX ASSIST IN BED. PT TELE REMAINS NSR. PT APPETITE IS MODERATE
REQUIRING ASSISTANCE. NO PROCEDURES DONE THIS SHIFT. CONTACT PRECAUTIONS
MAINTAINED T/O SHIFT. PT HAD A VISITOR THIS MENDEZ. PT IS IN BED, CALL LIGHT IN
REACH, LOW POSITION.

## 2021-08-10 NOTE — NUR
PT IS AAO X 3-4, FORGETFUL, HX TBI. REPORTS PAIN IN LLQ ABD, GOT ROXICODONE
AND TYLENOL. REPORTS OCC SOB, ON 4L O2 NC AT 94%. REPORTS A LITTLE NAUSEA BUT
ALSO ASKS FOR FOOD. TELE NSR WITH OCC PVC'S. BS . DRESSINGS X 3 ON LUE,
SCABS ON UE'S. DRESSINGS ON LE'S WITH 10% DRAINAGE.

## 2021-08-10 NOTE — NUR
0400 PT REQUESTED AND RECIEVED LEONORA CRACKERS. NO APPARENT SIGNS OF DISTRESS.
CALL LIGHT IS IN REACH.

## 2021-08-10 NOTE — NUR
0205 PT REQUESTED AND RECIEVED TYLENOL. WILL EVAL FOR EFFECT. NO OTHER
APPARENT SIGNS OF DISTRESS. CALL LIGHT IS IN REACH.

## 2021-08-10 NOTE — NUR
8/9/21 2047 PT LYING IN BED, REPORTS PAIN IN LLQ ABD, PAIN MEDS GIVEN, WILL
EVAL FOR EFFECT. PT REPORTS A LITTLE NAUSEA BUT IS ASKING FOR FOOD. DRESSINGS
X 3 ON LUE C/D/I. SCABS ON UE'S. DRESSINGS ON LE'S, 10% DRAINAGE. TELE NSR
WITH OCC PVC'S. . NO OTHER APPARENT SIGNS OF DISTRESS. CALL LIGHT IS IN
REACH.

## 2021-08-10 NOTE — NUR
SHIFT SUMMARY
PT MAKING MANY REQUESTS WHEN AT BEDSIDE.  FRIEND LEWIS IN TO VISIT THIS
AFTERNOON.  DRESSINGS TO LEGS CHANGED WHILE LEWIS IN ROOM AND HE REPORTS LEGS
ARE SIGNIFICANTLY IMPROVED.  HASNT VOIDED YET TODAY.  DIDNT HAVE DIALYSIS
TODAY.  NO BM EITHER.  ABDOMEN REMAINS DISTENDED BUT DID PASS GAS.

## 2021-08-10 NOTE — NUR
PT LYING IN BED, AWAKE, WATCHING TV. NO APPARENT SIGNS OF DISTRESS. CALL LIGHT
IS IN REACH. NO OTHER CHANGES THIS SHIFT.

## 2021-08-10 NOTE — NUR
theraputic time with patient. His friend called and he was very happy to
have his phone and speak with his friend. Polst on file will return. pt now
has guardian.

## 2021-08-11 NOTE — NUR
Pt guardian met with pt and assessed his abilty and desires for a reasonable
and respectful plan of care for the fragile ill ewa. We rviewed his
disease precess, his functional ablities. We reviewed speech therapy notes.
Review of dialysis clinical asessment.
 Guardian also interviewed pt life long friend who stated hewould not want
life support. We discussed risk for infection and risk for hospitalization.
Review cardiac history and hsi cognative ability.
  We were not able to review his belief systems or spiritual  history. We then
discussed ethical care of somewone with congantive and phsycial abilites and
preventing exptrodianry suffering. decision was made to place pt on hospice.
Care team updated and goal is to place him in a memory home on hospice that is
close to his friend so he can visit him more .

## 2021-08-12 NOTE — NUR
Patient declined wound care today r/t pain. Pain meds increased today to
cover. Pt is taking off NC at times and desaturating; NC replaced to pt's nose
and instructed to please keep it on. Takes medications whole with water,
requesting regular coke instead of diet coke.

## 2021-08-12 NOTE — NUR
PT HAS BEEN QUIET MOST OF MORNING SINCE BREAKFAST AFTER BEING MEDICATED FOR
PAIN.  OXYGEN REPLACED DUE TO PULSE OX BEING LOW AND PT REPORTING FEELING SOB.
RESTARTED 2L/M.  REPORTED BREATHING IMPROVED AFTER O2 PLACED.  NO FURTHER
NEEDS REQUESTED. REPORT GIVEN TO HONORIO FUENTES.

## 2021-08-12 NOTE — NUR
ASSUMED CARE
RECEIVED REPORT FROM GINA OLMEDO. PT RESTING, IN NAD. NO ACUTE NEEDS ASSESSED AT
THIS TIME. CALL LIGHT, POSSESSIONS IN REACH, BED IN LOW AND LOCKED POSITION
WITH ALARMS ON. APPEARS COMFORTABLE.

## 2021-08-12 NOTE — NUR
SHIFT SUMMARY
PATIENT ALERT AND ORIENTED TO SELF. HAD NO COMPLAINTS OF PAIN OR SHORTNESS OF
BREATH. SLEPT WELL OVERNIGHT. NO ACUTE ISSUES NOTED. POWERGLIDE PATENT AND
FLUSHED. BED IN LOWEST POSITION WITH WHEELS LOCKED AND ALARM ON. CALL LIGHT
WITHIN REACH. REPORT GIVEN TO ONCOMING RN.

## 2021-08-12 NOTE — NUR
Pt is alert, somewhat disoriented. Pt reoriented, states 9/10 pain.
Medications given, blood draw, repositioned. Will call dr for new orders for
pain medication if appropriate.

## 2021-08-13 NOTE — NUR
NIGHT SHIFT SUMMARY
PT ASLEEP, IN NAD. NO ACUTE CONCERNS TO REPORT OVERNIGHT, APPEARED TO SLEEP
WELL. RESPS E/U, O2 SATS WNL ON 2L/NC. VSS. PAIN MANAGED PER EMAR, EFFECTIVE.
NO ACUTE NEEDS ASSESSED AT THIS TIME. CALL LIGHT, POSSESSIONS IN REACH, BED IN
LOW AND LOCKED POSITION WITH ALARMS ON.

## 2021-08-13 NOTE — NUR
Ethics consult order update: the principle, who is under a tribunally
appointed guardianship arrangement is suffering from unstable testamentary
capacity, and has been confirmed to be medically eligible for a hospice
election. His fudiciary agent, and proxy decision maker, has reported that
she would like to maintain a non-aggressive approach to care, as she sees this
as the most dignified and clinically reasonable approach. Having reviewed the
details of this case with palliative care, I would agree that this strategy
is morally and legally suitable.
 
Thank you for this consult.
 
Liu Wick Th.D. Pt is alert awake, and appropriate, in no acute distress, o2 sat 100% on room air clear lungs b/l, no increased work of breathing, call bell within reach, lighting adequate in room, room free of clutter will continue to monitor awaiting admission improvement in rash yet still evident,, pt appears more comfortable at this time,

## 2021-08-13 NOTE — NUR
Pt awaiting discharge on hospice. Plan is comfort measures will offer pt
dialysis and medications. Decisional process will remain with guardian as pt
fraility and disabilty require this portection.
  Ethic consult inititated as in accordance with [facility] directive #3. We will
also offer support to his Guardian as this pt is on life support through
dialysis. Historically pt has not tolerated dialysis well and nursing has
reported to his guardian he has asked to be taken off due to poor tolerance.
  review of past two shift pt has had minimal PO intake and has limited
interst in food.
  Pt plan reviewed with hospitalist will continue care and hospice plan. Goal
is quality of life and less suffering for this frail patient.

## 2021-08-13 NOTE — NUR
DIALYSIS
PT WAS TO HAVE DIALYSIS TODAY. BUT NOTIFIED THAT HE IS CONSIDERING HOSPICE AND
WOULDN'T BE HAVING DIALYSIS. THEN TOLD HE MAY CONTINUE WITH DIALYSIS. NOTIFIED
DR ROLON. WILL REVISIT HIS DECISION TOMORROW.

## 2021-08-14 NOTE — NUR
SHIFT SUMMARY
PT AXO TO SELF, FORGETFUL. APPEARS FRUSTRATED WITH NURSE AND MAKES COMMENTS
ABOUT THIS NURSE "MESSING UP" DESPITE NO ERRORS BEING MADE. PT COMPLAINS OF
PAIN. MEDICATED PER EMAR. PALLIATIVE CARE NURSE LENORA CONTACTED DR POWERS FOR
MORE PAIN CONTROL. MEDICATED WITH 5MG ROXINOL, PT REPORTS NO IMPROVEMENT IN
PAIN. POWERGLIDE TO EMMA PATENT AND SALINE LOCKED. DRESSING TO COCCYX CHANGED.
DRESSINGS TO BLE CHANGED. BED IN LOW POSITION, CALL LIGHT WITHIN REACH.

## 2021-08-14 NOTE — NUR
SHIFT SUMMARY
PT IS A 59 Y/O MALE, ADMITTED FOR HYPERKALEMIA AND CURRENTLY ON COMFORT CARE.
PT IS A&O X SELF, ANXIOUS AND YELLS OUT FREQUENTLY WHEN AWAKE. HE WAS
MEDICATED WITH PAIN AND ANXIETY MEDS AT HS. NO C/O NAUSEA. PT'S O2 AT 2L,
INCREASED TO 5L DURING THE NIGHT AS PT WAS DESATTING WHILE ASLEEP. NO ACUTE
CHANGES IN PT CONDITION NOTED DURING THE NIGHT. WILL CONTINUE TO MONITOR AND
TREAT PER EMAR UNTIL HAND OFF TO DAY SHIFT RN.

## 2021-08-15 NOTE — NUR
SHIFT SUMMARY-
PT. COMFORT CARE. SLEPT T/O THE NIGHT, NO APPARENT DISTRESS NOTED.
MEDICATED 1X FOR PAIN WITH GOOD EFFECT. REPOSTIONED FOR COMFORT AND PRN,
TOLERATED WELL. CLARK CATHETER PATENT AND DRAINING W/ SOME SEDIMENT. CALL
LIGHT WITHIN REACH AND SIDE RAILS UPX2. WILL CONT TO MONITOR.

## 2021-08-15 NOTE — NUR
BED BATH AND WOUND DRESSING CHANGES COMPLETED. PAIN MEDS GIVEN PRIOR, PT
TOLERATED THE ACTIVITY WELL. PT IS CURRENTLY RESTING IN BED, HIPS FLOATED AND
APPEARS COMFORTABLE.

## 2021-08-15 NOTE — NUR
SHIFT SUMMARY
PT REMAINS ON COMFORT CARE MEASURES. PAIN MEDICATIONS GIVEN PRN, GOOD ORAL
INTAKE T/O SHIFT. BED BATH COMPLETED THIS SHIFT AS WELL AS BLE DRESSING
CHANGES. CLARK PATENT AND DRAINING APPEARS COMFORTABLE, CURRENTLY RESTING IN
BED SLEEPING.

## 2021-08-16 NOTE — NUR
pt comfortable review with nursing his needs will follow for symtoms and
monitor tolerance of dialysis.

## 2021-08-16 NOTE — NUR
SUMMARY: PT CONTINUES ON COMFORT CARE. A/O, SLOW TO RESPOND TO QUESTIONS. PT
HAD DIALYSIS TODAY. DENIED PAIN AND CALLED AT TIMES FOR REPOSITIONING. PT
APPEARED COMFORTABLE TODAY. WOUNDS AT LEGS WNL. WILL CTM AND REPORT TO NOC RN.

## 2021-08-16 NOTE — NUR
ASSUMED CARE. YNES IS AOX2 VERY SLOW, FLAT AFFECT AND WITHDRAWN. SITTING
STRAIGHT UP IN BED, ONLY ATE FEW BITES OF HIS FOOD. DENIES ANY N/V. BT
HYPOACTIVE. DECREASE IN APPETITE. DRESSINGS TO BLE, RIGHT HEEL, RIGHT ELBOW
ALL INTACT. SOME SCROTUM EDEMA NOTED. CATHETER PATENT AND DRAINING CLEAR
YELLOW URINE. DIALYSIS PORT TO RIGHT UPPERE CHEST. REPORTS PAIN ALL OVER
10/10. LUNG SOUNDS DIMINISHED IN BASES, O2 AT 2 LITERS FOR COMFORT. WILL
MEDICATE AND PROVIDE COMFORT CARE. CALL LIGHT IS IN REACH, BED ALARM IS ON.

## 2021-08-17 NOTE — NUR
PT EXIBITS NO SIGN OF PAIN, NO SOB. HE CONTINUES WITH DIALYSIS. HE NOW HAS A
FOLEYH CHATHETER IN PLACE.

## 2021-08-17 NOTE — NUR
SHIFT SUMMARY: PT VERY FLAT AFFECT, QUITE, FORGETFUL. CALLS AND DOES NOT KNOW
WHY HE CALLS. LUNG SOUNDS DIMINISHED, O2 2L FOR COMFORT. CATHETER PATENT AND
DRAINING DARK YELLOW URINE SMALL AMOUNTS. MEDICATED FOR PAIN X2. VERY DRY
SKIN. SWELLING TO BLE, GROIN, RIGHT ARM. BRUSING SCATTERED. NO NAUSEA.
BLOOD SUGAR 'S. COMFORT PROVIDED. NO OTHER CHANGES TO NOTE. CALL LIGHT
IN REACH.

## 2021-08-17 NOTE — NUR
alert and orintated to location and self, asked for a female nurse to wash his
private parts, this male nurse informed him that the request was inappropriate
and offered to do any cleaning that needed to be done, informed the cn of the
request and response and asked the cna to report any inappropriate comments
that the pt made, have heard nothing else about it from either party, call
light in reach, saline locked, on rm air, will continue to monitor and treat
until share report with noc nurse

## 2021-08-18 NOTE — NUR
PT HAD DIALYSIS THIS SHIFT. NO ACUTE CHANGES. PT TO DC TO [facility]
TOMORROW. CALL LIGHT WITHIN REACH, BED IN LOW POSITION, ALARM ON. WILL
CONTINUE TO MONTIOR.

## 2021-08-18 NOTE — NUR
SUMMARY
NO NEW ISSUES NOTED. PT PAIN MANAGED AS PER EMAR W/ RELIEF. PT CURRENTLY AWAKE
IN NO DISTRESS. CALL LIGHT IN REACH. BED ALARM ON.

## 2021-08-18 NOTE — NUR
SHIFT SUMMARY: COMFORT CARE PATIENT PENDING PLACEMENT A&O
TO SELF CALM AND COOPERATIVE WITH CARE. PT SLEPT T/O THE SHIFT RESPIRATIONS
WERE EVEN UNLABORED LUNGS SOUNDS DIMINISHED AT BASES. CALL LIGHT IS WITHIN
REACH PT INTERMITTENTLY USES APPROPERIATELY BED LOWERED.

## 2021-08-18 NOTE — NUR
MET WITH PT FOR VISIT TODAY. HE DIDN'T TALK MUCH, BUT DID TELL ME HE HAD JUST
RETURNED FROM DIALYSIS. HE DENIES PAIN, DENIES SOB. HE IS LYING BACK, LOOKING
TO THE TELEVISION. RN STATES NO CHANGES IN HIS CARE PLAN TODAY.

## 2021-08-19 NOTE — NUR
DISCHARGE NOTE
 
PATIENT DISCHARGED VIA TRANSPORT WITH NO ISSUES TO [facility]. PATIENT WAS
GIVEN ALL BELONINGS. THIS RN CALLED DIALYSIS TO SET UP APPOINTMENT FOR NEXT
APPOINTMENT, CALLED [facility] AND ARRANGED TRANSPORT FROM FACILITY TO
DIALYSIS.

## 2021-08-19 NOTE — NUR
SUMMARY
NO NEW ISSUES NOTED. PT HAS SLEPT OFF AND ON T/O SHIFT. PT IS CONFUSED AT
TIMES AND HAS HALLUCINATIONS. PT PAIN MANAGED WELL. PT CURRENTLY AWAKE IN NO
DISTRESS. CALL LIGHT IN REACH. BED ALARM ON.

## 2021-08-31 NOTE — NUR
Ethic consult placed. for review of level of care. Contacted Liu Wick who
contacted Edie his guardian and advised her of his status back in the
hospital. Pt made DNR will revisit hospice plan.  hospitaist notified of
sharath of this frail patient. Will update care managers.

## 2021-09-01 NOTE — NUR
Multidisciplinary teleconference facilitated. Case details, medical history,
and prognosis reviewed with the principals guardian. Risks and benefits of
continued aggressive therapy discussed.  In light of this information, the
guardian agreed to a discharge back to [facility] with a hospice election
ordered. The consensus was that this would be the most dignified,
proportionate, and medically responsible course at this point. Dr Hirsch
confirms that she is also in alignment with this plan.
 
 
Thank you for this consult.
 
Liu Wick ThD

## 2021-09-01 NOTE — NUR
Late entry for conversation facilitated with the principals guardian, Danae Tavera, on 8/30/21 regarding the code status of Mr Koby Joseph. Danae prefers
that the principal be designated as DNR / DNI. The order was submitted.
A care conference is scheduled for 9:30 on 9/1/21 to discuss arrangements to
organize his discharge back to [facility] on hospice.
 
Thankyou for this consult.
 
Liu Wick ThD